# Patient Record
Sex: FEMALE | Race: BLACK OR AFRICAN AMERICAN | Employment: OTHER | URBAN - METROPOLITAN AREA
[De-identification: names, ages, dates, MRNs, and addresses within clinical notes are randomized per-mention and may not be internally consistent; named-entity substitution may affect disease eponyms.]

---

## 2022-04-02 ENCOUNTER — APPOINTMENT (OUTPATIENT)
Dept: GENERAL RADIOLOGY | Age: 77
DRG: 684 | End: 2022-04-02
Attending: HOSPITALIST
Payer: MEDICARE

## 2022-04-02 ENCOUNTER — APPOINTMENT (OUTPATIENT)
Dept: ULTRASOUND IMAGING | Age: 77
DRG: 684 | End: 2022-04-02
Attending: HOSPITALIST
Payer: MEDICARE

## 2022-04-02 ENCOUNTER — APPOINTMENT (OUTPATIENT)
Dept: CT IMAGING | Age: 77
DRG: 684 | End: 2022-04-02
Attending: EMERGENCY MEDICINE
Payer: MEDICARE

## 2022-04-02 ENCOUNTER — HOSPITAL ENCOUNTER (INPATIENT)
Age: 77
LOS: 3 days | Discharge: HOME HEALTH | DRG: 684 | End: 2022-04-05
Attending: EMERGENCY MEDICINE | Admitting: HOSPITALIST
Payer: MEDICARE

## 2022-04-02 DIAGNOSIS — N17.9 ACUTE RENAL FAILURE, UNSPECIFIED ACUTE RENAL FAILURE TYPE (HCC): Primary | ICD-10-CM

## 2022-04-02 PROBLEM — N19 RENAL FAILURE: Status: ACTIVE | Noted: 2022-04-02

## 2022-04-02 PROBLEM — E86.0 DEHYDRATION: Status: ACTIVE | Noted: 2022-04-02

## 2022-04-02 PROBLEM — R63.4 WEIGHT LOSS: Status: ACTIVE | Noted: 2022-04-02

## 2022-04-02 PROBLEM — Z72.0 TOBACCO USE: Status: ACTIVE | Noted: 2022-04-02

## 2022-04-02 PROBLEM — I10 HTN (HYPERTENSION): Status: ACTIVE | Noted: 2022-04-02

## 2022-04-02 PROBLEM — Z78.9 ALCOHOL USE: Status: ACTIVE | Noted: 2022-04-02

## 2022-04-02 LAB
ALBUMIN SERPL-MCNC: 3.2 G/DL (ref 3.4–5)
ALBUMIN/GLOB SERPL: 0.7 {RATIO} (ref 0.8–1.7)
ALP SERPL-CCNC: 39 U/L (ref 45–117)
ALT SERPL-CCNC: 29 U/L (ref 13–56)
ANION GAP SERPL CALC-SCNC: 10 MMOL/L (ref 3–18)
APPEARANCE UR: ABNORMAL
AST SERPL-CCNC: 58 U/L (ref 10–38)
BACTERIA URNS QL MICRO: ABNORMAL /HPF
BASOPHILS # BLD: 0 K/UL (ref 0–0.1)
BASOPHILS NFR BLD: 0 % (ref 0–2)
BILIRUB SERPL-MCNC: 1.1 MG/DL (ref 0.2–1)
BILIRUB UR QL: NEGATIVE
BUN SERPL-MCNC: 52 MG/DL (ref 7–18)
BUN/CREAT SERPL: 22 (ref 12–20)
CALCIUM SERPL-MCNC: 9.6 MG/DL (ref 8.5–10.1)
CHLORIDE SERPL-SCNC: 102 MMOL/L (ref 100–111)
CO2 SERPL-SCNC: 25 MMOL/L (ref 21–32)
COLOR UR: ABNORMAL
CREAT SERPL-MCNC: 2.4 MG/DL (ref 0.6–1.3)
DIFFERENTIAL METHOD BLD: ABNORMAL
EOSINOPHIL # BLD: 0 K/UL (ref 0–0.4)
EOSINOPHIL NFR BLD: 0 % (ref 0–5)
EPITH CASTS URNS QL MICRO: ABNORMAL /LPF (ref 0–5)
ERYTHROCYTE [DISTWIDTH] IN BLOOD BY AUTOMATED COUNT: 13.1 % (ref 11.6–14.5)
GLOBULIN SER CALC-MCNC: 4.3 G/DL (ref 2–4)
GLUCOSE SERPL-MCNC: 90 MG/DL (ref 74–99)
GLUCOSE UR STRIP.AUTO-MCNC: NEGATIVE MG/DL
HCT VFR BLD AUTO: 32.7 % (ref 35–45)
HGB BLD-MCNC: 10.9 G/DL (ref 12–16)
HGB UR QL STRIP: NEGATIVE
IMM GRANULOCYTES # BLD AUTO: 0.1 K/UL (ref 0–0.04)
IMM GRANULOCYTES NFR BLD AUTO: 1 % (ref 0–0.5)
KETONES UR QL STRIP.AUTO: ABNORMAL MG/DL
LEUKOCYTE ESTERASE UR QL STRIP.AUTO: ABNORMAL
LIPASE SERPL-CCNC: 356 U/L (ref 73–393)
LYMPHOCYTES # BLD: 1.4 K/UL (ref 0.9–3.6)
LYMPHOCYTES NFR BLD: 15 % (ref 21–52)
MCH RBC QN AUTO: 27.7 PG (ref 24–34)
MCHC RBC AUTO-ENTMCNC: 33.3 G/DL (ref 31–37)
MCV RBC AUTO: 83.2 FL (ref 78–100)
MONOCYTES # BLD: 1.1 K/UL (ref 0.05–1.2)
MONOCYTES NFR BLD: 12 % (ref 3–10)
NEUTS SEG # BLD: 6.6 K/UL (ref 1.8–8)
NEUTS SEG NFR BLD: 72 % (ref 40–73)
NITRITE UR QL STRIP.AUTO: NEGATIVE
NRBC # BLD: 0 K/UL (ref 0–0.01)
NRBC BLD-RTO: 0 PER 100 WBC
PH UR STRIP: 5 [PH] (ref 5–8)
PLATELET # BLD AUTO: 222 K/UL (ref 135–420)
PMV BLD AUTO: 11.9 FL (ref 9.2–11.8)
POTASSIUM SERPL-SCNC: 3.2 MMOL/L (ref 3.5–5.5)
PROT SERPL-MCNC: 7.5 G/DL (ref 6.4–8.2)
PROT UR STRIP-MCNC: NEGATIVE MG/DL
RBC # BLD AUTO: 3.93 M/UL (ref 4.2–5.3)
RBC #/AREA URNS HPF: ABNORMAL /HPF (ref 0–5)
SODIUM SERPL-SCNC: 137 MMOL/L (ref 136–145)
SP GR UR REFRACTOMETRY: 1.01 (ref 1–1.03)
UROBILINOGEN UR QL STRIP.AUTO: 1 EU/DL (ref 0.2–1)
WBC # BLD AUTO: 9.2 K/UL (ref 4.6–13.2)
WBC URNS QL MICRO: ABNORMAL /HPF (ref 0–5)

## 2022-04-02 PROCEDURE — 81001 URINALYSIS AUTO W/SCOPE: CPT

## 2022-04-02 PROCEDURE — 83690 ASSAY OF LIPASE: CPT

## 2022-04-02 PROCEDURE — 74011250636 HC RX REV CODE- 250/636: Performed by: EMERGENCY MEDICINE

## 2022-04-02 PROCEDURE — 80053 COMPREHEN METABOLIC PANEL: CPT

## 2022-04-02 PROCEDURE — 74011000250 HC RX REV CODE- 250: Performed by: HOSPITALIST

## 2022-04-02 PROCEDURE — 65270000029 HC RM PRIVATE

## 2022-04-02 PROCEDURE — 96374 THER/PROPH/DIAG INJ IV PUSH: CPT

## 2022-04-02 PROCEDURE — 74011250637 HC RX REV CODE- 250/637: Performed by: HOSPITALIST

## 2022-04-02 PROCEDURE — 74176 CT ABD & PELVIS W/O CONTRAST: CPT

## 2022-04-02 PROCEDURE — 96361 HYDRATE IV INFUSION ADD-ON: CPT

## 2022-04-02 PROCEDURE — 76856 US EXAM PELVIC COMPLETE: CPT

## 2022-04-02 PROCEDURE — 71046 X-RAY EXAM CHEST 2 VIEWS: CPT

## 2022-04-02 PROCEDURE — 74011250636 HC RX REV CODE- 250/636: Performed by: HOSPITALIST

## 2022-04-02 PROCEDURE — 85025 COMPLETE CBC W/AUTO DIFF WBC: CPT

## 2022-04-02 PROCEDURE — 99285 EMERGENCY DEPT VISIT HI MDM: CPT

## 2022-04-02 RX ORDER — MIRTAZAPINE 15 MG/1
15 TABLET, FILM COATED ORAL
Status: DISCONTINUED | OUTPATIENT
Start: 2022-04-02 | End: 2022-04-05 | Stop reason: HOSPADM

## 2022-04-02 RX ORDER — ONDANSETRON 2 MG/ML
4 INJECTION INTRAMUSCULAR; INTRAVENOUS
Status: COMPLETED | OUTPATIENT
Start: 2022-04-02 | End: 2022-04-02

## 2022-04-02 RX ORDER — SODIUM CHLORIDE 9 MG/ML
75 INJECTION, SOLUTION INTRAVENOUS CONTINUOUS
Status: DISCONTINUED | OUTPATIENT
Start: 2022-04-02 | End: 2022-04-05 | Stop reason: HOSPADM

## 2022-04-02 RX ORDER — HEPARIN SODIUM 5000 [USP'U]/ML
5000 INJECTION, SOLUTION INTRAVENOUS; SUBCUTANEOUS EVERY 8 HOURS
Status: DISCONTINUED | OUTPATIENT
Start: 2022-04-02 | End: 2022-04-05 | Stop reason: HOSPADM

## 2022-04-02 RX ORDER — FENOFIBRATE 50 MG/1
200 CAPSULE ORAL
COMMUNITY

## 2022-04-02 RX ORDER — ATENOLOL AND CHLORTHALIDONE TABLET 100; 25 MG/1; MG/1
1 TABLET ORAL DAILY
COMMUNITY
End: 2022-04-05

## 2022-04-02 RX ORDER — FENOFIBRATE 145 MG/1
145 TABLET, COATED ORAL DAILY
Status: DISCONTINUED | OUTPATIENT
Start: 2022-04-03 | End: 2022-04-05 | Stop reason: HOSPADM

## 2022-04-02 RX ORDER — POTASSIUM CHLORIDE 20 MEQ/1
40 TABLET, EXTENDED RELEASE ORAL
Status: COMPLETED | OUTPATIENT
Start: 2022-04-02 | End: 2022-04-02

## 2022-04-02 RX ORDER — AMLODIPINE AND BENAZEPRIL HYDROCHLORIDE 5; 20 MG/1; MG/1
2 CAPSULE ORAL DAILY
COMMUNITY
End: 2022-04-05

## 2022-04-02 RX ADMIN — MIRTAZAPINE 15 MG: 15 TABLET, FILM COATED ORAL at 17:06

## 2022-04-02 RX ADMIN — HEPARIN SODIUM 5000 UNITS: 5000 INJECTION INTRAVENOUS; SUBCUTANEOUS at 23:30

## 2022-04-02 RX ADMIN — ONDANSETRON 4 MG: 2 INJECTION INTRAMUSCULAR; INTRAVENOUS at 11:04

## 2022-04-02 RX ADMIN — THIAMINE HYDROCHLORIDE: 100 INJECTION, SOLUTION INTRAMUSCULAR; INTRAVENOUS at 17:00

## 2022-04-02 RX ADMIN — POTASSIUM CHLORIDE 40 MEQ: 20 TABLET, EXTENDED RELEASE ORAL at 17:06

## 2022-04-02 RX ADMIN — SODIUM CHLORIDE 100 ML/HR: 900 INJECTION, SOLUTION INTRAVENOUS at 17:07

## 2022-04-02 RX ADMIN — SODIUM CHLORIDE 1000 ML: 9 INJECTION, SOLUTION INTRAVENOUS at 11:07

## 2022-04-02 RX ADMIN — HEPARIN SODIUM 5000 UNITS: 5000 INJECTION INTRAVENOUS; SUBCUTANEOUS at 17:06

## 2022-04-02 NOTE — ED NOTES
pts daughter came out of room 7 up to the nurses station while workers had open patient charts, pt was given a call bell, but came to the station to flag a nurse down, When this nurse approached the pts daughter , I asked if there was an emergency as I was taking report on a new group of patients and giving report to the pts new nurse. Pt would not tell me if it was an emergency, but turned and walked away from me. She then went to my Charge nurse Dinorah Tomlinson to discuss her issue. I was not present during the conversation so I do not know what was going on with the pt. Kayla Rogers  Took report and went to check on pt.

## 2022-04-02 NOTE — PROGRESS NOTES
Unable to start IV fluids or Banana bag at this time, pt IV site is leaking. She is eating dinner at this time. Waiting for pt to finish dinner before attempting new site and starting fluids.

## 2022-04-02 NOTE — ED TRIAGE NOTES
I am not feeling well. I am having dry heaves I have not appetite.  The dr changed my medication and I have not been able to eat right since

## 2022-04-02 NOTE — PROGRESS NOTES
Pt admitted to floor from ED. She is pleasant and cooperative with care. Family is present at bedside, pleasant and cooperative with care as well. Pt is able to transfer to bed independently with ease. She reports no pain at this time and only minimal nausea. Her lungs are clear but diminished and bowels are active. Pt reports having a BM 2 days ago, but having recent issues with her bowels due to abx use prior to hospitalization which led to diarrhea and then imodium which led to constipation and she used miralax to help. Pt is encouraged to call for help when using the restroom for now until baseline functional status is determined. Pt reports using a cane at home where she lives with her mother independently. Skin is intact but dry and flaky. Trace edema noted to BLE but non-pitting and no tenderness reported. IV site is intact and flushed, capped. Call bell within reach and pt provided with water only.  Awaiting further orders from MD.

## 2022-04-02 NOTE — ED PROVIDER NOTES
52-year-old female with multiple medical issues presents to the emergency department with 5 months of not feeling well decreased p.o. intake and recent 50 pound weight loss. Patient has no history of this. She says she the ability to eat but does not want to eat. She also has been having dry heaves. Patient has no pain issues. Nothing makes it feel better or worse. Her daughter is at the bedside during interview daughter feels that patient is dehydrated. Patient is having bowel movements and having decreased urinary output. Patient has no other issues expressed. He has not seen her doctor since November of last year. Past Medical History:   Diagnosis Date    Hypertension        History reviewed. No pertinent surgical history. History reviewed. No pertinent family history. Social History     Socioeconomic History    Marital status: SINGLE     Spouse name: Not on file    Number of children: Not on file    Years of education: Not on file    Highest education level: Not on file   Occupational History    Not on file   Tobacco Use    Smoking status: Current Every Day Smoker    Smokeless tobacco: Not on file   Substance and Sexual Activity    Alcohol use: Yes     Comment: drinks about once or twice week    Drug use: Never    Sexual activity: Not on file   Other Topics Concern    Not on file   Social History Narrative    Not on file     Social Determinants of Health     Financial Resource Strain:     Difficulty of Paying Living Expenses: Not on file   Food Insecurity:     Worried About Running Out of Food in the Last Year: Not on file    Susanne of Food in the Last Year: Not on file   Transportation Needs:     Lack of Transportation (Medical): Not on file    Lack of Transportation (Non-Medical):  Not on file   Physical Activity:     Days of Exercise per Week: Not on file    Minutes of Exercise per Session: Not on file   Stress:     Feeling of Stress : Not on file   Social Connections:     Frequency of Communication with Friends and Family: Not on file    Frequency of Social Gatherings with Friends and Family: Not on file    Attends Jain Services: Not on file    Active Member of Clubs or Organizations: Not on file    Attends Club or Organization Meetings: Not on file    Marital Status: Not on file   Intimate Partner Violence:     Fear of Current or Ex-Partner: Not on file    Emotionally Abused: Not on file    Physically Abused: Not on file    Sexually Abused: Not on file   Housing Stability:     Unable to Pay for Housing in the Last Year: Not on file    Number of [place] in the Last Year: Not on file    Unstable Housing in the Last Year: Not on file         ALLERGIES: Patient has no known allergies. Review of Systems   Constitutional: Positive for appetite change. Negative for activity change, fatigue, fever and unexpected weight change. HENT: Negative. Respiratory: Negative. Cardiovascular: Negative. Gastrointestinal: Positive for nausea. Negative for abdominal distention, abdominal pain, anal bleeding, blood in stool, constipation and vomiting. Genitourinary: Negative. Musculoskeletal: Negative. Skin: Negative. Neurological: Negative. Hematological: Negative. All other systems reviewed and are negative. Vitals:    04/02/22 0949 04/02/22 1014 04/02/22 1029 04/02/22 1100   BP:  (!) 101/55 (!) 104/58 (!) 153/72   Pulse:    77   Resp:    16   Temp:       SpO2: 99% 100% 100% 100%   Weight:       Height:                Physical Exam  Vitals and nursing note reviewed. Constitutional:       General: She is not in acute distress. Appearance: Normal appearance. She is not ill-appearing, toxic-appearing or diaphoretic. HENT:      Head: Normocephalic. Nose: Nose normal.      Mouth/Throat:      Mouth: Mucous membranes are moist.   Eyes:      Extraocular Movements: Extraocular movements intact.    Neck:      Vascular: No carotid bruit. Cardiovascular:      Rate and Rhythm: Normal rate and regular rhythm. Pulses: Normal pulses. Heart sounds: Normal heart sounds. Pulmonary:      Effort: Pulmonary effort is normal.      Breath sounds: Normal breath sounds. Abdominal:      General: There is no distension. Palpations: Abdomen is soft. There is no mass. Tenderness: There is no abdominal tenderness. There is no right CVA tenderness, left CVA tenderness, guarding or rebound. Hernia: No hernia is present. Musculoskeletal:         General: Normal range of motion. Cervical back: Normal range of motion and neck supple. No rigidity or tenderness. Lymphadenopathy:      Cervical: No cervical adenopathy. Skin:     General: Skin is warm. Capillary Refill: Capillary refill takes less than 2 seconds. Neurological:      General: No focal deficit present. Mental Status: She is alert and oriented to person, place, and time. Psychiatric:         Mood and Affect: Mood normal.         Behavior: Behavior normal.          MDM  Number of Diagnoses or Management Options  Diagnosis management comments: I contacted the hospitalist  When I noticed that the patient had acute kidney injury. Last BMP in November was normal.  He wanted a CAT scan because he felt that the patient had a possible bowel obstruction. When the CAT scan result came back I contacted the hospitalist because my concern is she has kidney issue and is not tolerating p.o. He wanted patient to be hydrated and sent home. It was communicated to me \" there is nothing we can do for her in the hospital.\"  Patient has an admittable diagnosis and is not tolerating fluids due to anorexia. Do not want the patient to become more dehydrated and developed other issues. Family is requesting to speak to the hospitalist I spoke to the hospitalist waiting for him to come down to talk to family patient.        Amount and/or Complexity of Data Reviewed  Clinical lab tests: ordered and reviewed  Tests in the radiology section of CPT®: ordered and reviewed  Tests in the medicine section of CPT®: ordered and reviewed  Discussion of test results with the performing providers: yes  Decide to obtain previous medical records or to obtain history from someone other than the patient: yes  Obtain history from someone other than the patient: yes  Review and summarize past medical records: yes  Independent visualization of images, tracings, or specimens: yes    Risk of Complications, Morbidity, and/or Mortality  Presenting problems: moderate  Diagnostic procedures: moderate  Management options: moderate    Patient Progress  Patient progress: stable         Procedures

## 2022-04-02 NOTE — H&P
History & Physical    Patient: Ray Denny MRN: [id number]  CSN: [id number]    YOB: 1945  Age: 77 y.o. Sex: female      DOA: 4/2/2022  Primary Care Provider:  Maury Connor MD      Assessment/Plan     Patient Active Problem List   Diagnosis Code    Dehydration E86.0    Renal failure N19    HTN (hypertension) I10    Weight loss R63.4    Alcohol use Z72.89    Tobacco use Z72.0       Admit to medical floor    FARRAH -  Pre renal  No obstruction on CT abdomen  Started on IVF  Last renal function on record 10/2021 with normal BUN/Cr      HTN -  BP stable, will hold BP meds for now    Weight loss -  CT abdomen with questionable peripancreatic stranding suggestive of acute pancreatitis. Lipase in normal range. Also showed Indeterminate partially calcified mass with dystrophic calcifications of the posterior right adnexa, benign-appearing but incompletely assessed. Correlation  with prior imaging would be helpful. Consider follow-up nonemergent pelvic  Ultrasound. CXR given history of smoking. ETOH use -  Started on banana bag  Watch for withdrawal symptoms    PT/OT    DVT prophylaxis with heparin. Estimated length of stay : 1-2 days    CC: decreased PO intake. HPI:     Ray Denny is a 77 y.o. female with HTN, hyperlipidemia, is brought to ER by her daughter with concerns of decreased PO intake and weight loss. Patient reports that she had her HTN medication changed in [place] last year. Since then she has not been feeling well. Her symptoms which include weakness and decreased appetite got worse after she took booster dose of COVID 19 vaccine. Daughter reports that for the past one month she had progressive decline in her condition and appetite. She has not been eating or drinking. At times she has dry heaves. Daughter reports that patient lost about 50lbs in the past 5 months. Patient drinks beer daily. Is a smoker and has been smoking for years.    In ER work up showed elevated BUN/Cr at 52/2. 4. her CT abdomen/pelvis showed questionable pancreatitis, however lipase in normal range. Past Medical History:   Diagnosis Date    Hyperlipidemia     Hypertension        History reviewed. No pertinent surgical history. History reviewed. No pertinent family history. Social History     Socioeconomic History    Marital status: SINGLE   Tobacco Use    Smoking status: Current Every Day Smoker   Substance and Sexual Activity    Alcohol use: Yes     Comment: drinks about once or twice week    Drug use: Never       Prior to Admission medications    Medication Sig Start Date End Date Taking? Authorizing Provider   amLODIPine-benazepril (LOTREL) 5-20 mg per capsule Take 2 Capsules by mouth daily. Indications: high blood pressure   Yes Provider, Historical   atenoloL-chlorthalidone (TENORETIC) 100-25 mg per tablet Take 1 Tablet by mouth daily. Indications: high blood pressure   Yes Provider, Historical   Fenofibrate 50 mg cap Take 200 mg by mouth. Indications: excessive fat in the blood   Yes Provider, Historical       No Known Allergies    Review of Systems  Gen: No fever, chills, malaise, see above   Heent: No headache, rhinorrhea, epistaxis, ear pain, hearing loss, sinus pain, neck pain/stiffness, sore throat. Heart: No chest pain, palpitations, BEYER, pnd, or orthopnea. Resp: No cough, hemoptysis, wheezing and shortness of breath. GI: No nausea, vomiting, diarrhea, constipation, melena or hematochezia. : No urinary obstruction, dysuria or hematuria. Derm: No rash, new skin lesion or pruritis. Musc/skeletal: no bone or joint complains. Vasc: No edema, cyanosis or claudication. Endo: No heat/cold intolerance, no polyuria,polydipsia or polyphagia. Neuro: No unilateral weakness, numbness, tingling. No seizures. Heme: No easy bruising or bleeding.           Physical Exam:     Physical Exam:  Visit Vitals  /64   Pulse 69   Temp 97.4 °F (36.3 °C)   Resp 15 Ht 5' 5\" (1.651 m)   Wt 90.7 kg (200 lb)   SpO2 100%   BMI 33.28 kg/m²      O2 Device: None (Room air)    Temp (24hrs), Av.3 °F (36.3 °C), Min:97.1 °F (36.2 °C), Max:97.4 °F (36.3 °C)    701 -  1900  In: 1000 [I.V.:1000]  Out: -    No intake/output data recorded. General:  Awake, cooperative, no distress. Head:  Normocephalic, without obvious abnormality, atraumatic. Eyes:  Conjunctivae/corneas clear, sclera anicteric, PERRL, EOMs intact. Nose: Nares normal. No drainage or sinus tenderness. Throat: Lips, mucosa, and tongue normal.    Neck: Supple, symmetrical, trachea midline, no adenopathy. Lungs:   Clear to auscultation bilaterally. Heart:   S1, S2, no murmur, click, rub or gallop. Abdomen: Soft, non-tender. Bowel sounds normal. No masses,  No organomegaly. Extremities: Extremities normal, atraumatic, no cyanosis or edema. Capillary refill normal.   Pulses: 2+ and symmetric all extremities. Skin: Skin color pink, turgor normal. No rashes or lesions   Neurologic: CNII-XII intact. No focal motor or sensory deficit.        Labs Reviewed:    CMP:   Lab Results   Component Value Date/Time     2022 10:10 AM    K 3.2 (L) 2022 10:10 AM     2022 10:10 AM    CO2 25 2022 10:10 AM    AGAP 10 2022 10:10 AM    GLU 90 2022 10:10 AM    BUN 52 (H) 2022 10:10 AM    CREA 2.40 (H) 2022 10:10 AM    GFRAA 24 (L) 2022 10:10 AM    GFRNA 20 (L) 2022 10:10 AM    CA 9.6 2022 10:10 AM    ALB 3.2 (L) 2022 10:10 AM    TP 7.5 2022 10:10 AM    GLOB 4.3 (H) 2022 10:10 AM    AGRAT 0.7 (L) 2022 10:10 AM    ALT 29 2022 10:10 AM     CBC:   Lab Results   Component Value Date/Time    WBC 9.2 2022 10:10 AM    HGB 10.9 (L) 2022 10:10 AM    HCT 32.7 (L) 2022 10:10 AM     2022 10:10 AM     All Cardiac Markers in the last 24 hours: No results found for: CPK, CK, CKMMB, CKMB, RCK3, CKMBT, CKNDX, CKND1, KANA, TROPT, TROIQ, RAEGAN, TROPT, TNIPOC, BNP, BNPP      Procedures/imaging: see electronic medical records for all procedures/Xrays and details which were not copied into this note but were reviewed prior to creation of Plan    Please note that this dictation was completed with [employer], the computer voice recognition software. Quite often unanticipated grammatical, syntax, homophones, and other interpretive errors are inadvertently transcribed by the computer software. Please disregard these errors. Please excuse any errors that have escaped final proofreading.         CC: Lorenza Sandra MD

## 2022-04-02 NOTE — Clinical Note
Status[de-identified] INPATIENT [101]   Type of Bed: Medical [8]   Cardiac Monitoring Required?: No   Inpatient Hospitalization Certified Necessary for the Following Reasons: 3. Patient receiving treatment that can only be provided in an inpatient setting (further clarification in H&P documentation)   Admitting Diagnosis: Renal failure [247840]   Admitting Diagnosis: Dehydration [276.51. ICD-9-CM]   Admitting Physician: Jelena Carmichael [[id number]]   Attending Physician: Jelena Carmichael [[id number]]   Estimated Length of Stay: 2 Midnights   Discharge Plan[de-identified] Home with Office Follow-up

## 2022-04-02 NOTE — PROGRESS NOTES
Problem: General Medical Care Plan  Goal: *Vital signs within specified parameters  Outcome: Progressing Towards Goal  Goal: *Absence of infection signs and symptoms  Outcome: Progressing Towards Goal  Goal: *Optimal pain control at patient's stated goal  Outcome: Progressing Towards Goal  Goal: *Skin integrity maintained  Outcome: Progressing Towards Goal  Goal: *Fluid volume balance  Outcome: Progressing Towards Goal  Goal: *Anxiety reduced or absent  Outcome: Progressing Towards Goal  Goal: *Progressive mobility and function (eg: ADL's)  Outcome: Progressing Towards Goal

## 2022-04-03 LAB
ANION GAP SERPL CALC-SCNC: 6 MMOL/L (ref 3–18)
BUN SERPL-MCNC: 45 MG/DL (ref 7–18)
BUN/CREAT SERPL: 25 (ref 12–20)
CALCIUM SERPL-MCNC: 8.6 MG/DL (ref 8.5–10.1)
CHLORIDE SERPL-SCNC: 110 MMOL/L (ref 100–111)
CO2 SERPL-SCNC: 24 MMOL/L (ref 21–32)
CREAT SERPL-MCNC: 1.81 MG/DL (ref 0.6–1.3)
ERYTHROCYTE [DISTWIDTH] IN BLOOD BY AUTOMATED COUNT: 13.2 % (ref 11.6–14.5)
GLUCOSE SERPL-MCNC: 61 MG/DL (ref 74–99)
HCT VFR BLD AUTO: 26.7 % (ref 35–45)
HGB BLD-MCNC: 8.7 G/DL (ref 12–16)
MCH RBC QN AUTO: 27.9 PG (ref 24–34)
MCHC RBC AUTO-ENTMCNC: 32.6 G/DL (ref 31–37)
MCV RBC AUTO: 85.6 FL (ref 78–100)
NRBC # BLD: 0 K/UL (ref 0–0.01)
NRBC BLD-RTO: 0 PER 100 WBC
PLATELET # BLD AUTO: 165 K/UL (ref 135–420)
PMV BLD AUTO: 12.2 FL (ref 9.2–11.8)
POTASSIUM SERPL-SCNC: 4.5 MMOL/L (ref 3.5–5.5)
RBC # BLD AUTO: 3.12 M/UL (ref 4.2–5.3)
SODIUM SERPL-SCNC: 140 MMOL/L (ref 136–145)
TSH SERPL DL<=0.05 MIU/L-ACNC: 1.62 UIU/ML (ref 0.36–3.74)
WBC # BLD AUTO: 7.5 K/UL (ref 4.6–13.2)

## 2022-04-03 PROCEDURE — 74011000250 HC RX REV CODE- 250: Performed by: HOSPITALIST

## 2022-04-03 PROCEDURE — 97162 PT EVAL MOD COMPLEX 30 MIN: CPT

## 2022-04-03 PROCEDURE — 74011250636 HC RX REV CODE- 250/636: Performed by: HOSPITALIST

## 2022-04-03 PROCEDURE — 74011250637 HC RX REV CODE- 250/637: Performed by: HOSPITALIST

## 2022-04-03 PROCEDURE — 84425 ASSAY OF VITAMIN B-1: CPT

## 2022-04-03 PROCEDURE — 97116 GAIT TRAINING THERAPY: CPT

## 2022-04-03 PROCEDURE — 65270000029 HC RM PRIVATE

## 2022-04-03 PROCEDURE — 85027 COMPLETE CBC AUTOMATED: CPT

## 2022-04-03 PROCEDURE — 80048 BASIC METABOLIC PNL TOTAL CA: CPT

## 2022-04-03 PROCEDURE — 36415 COLL VENOUS BLD VENIPUNCTURE: CPT

## 2022-04-03 PROCEDURE — 84443 ASSAY THYROID STIM HORMONE: CPT

## 2022-04-03 RX ORDER — ASPIRIN 325 MG/1
100 TABLET, FILM COATED ORAL DAILY
Status: DISCONTINUED | OUTPATIENT
Start: 2022-04-03 | End: 2022-04-03

## 2022-04-03 RX ORDER — LOPERAMIDE HYDROCHLORIDE 2 MG/1
4 CAPSULE ORAL
Status: DISCONTINUED | OUTPATIENT
Start: 2022-04-03 | End: 2022-04-05 | Stop reason: HOSPADM

## 2022-04-03 RX ORDER — SAME BUTANEDISULFONATE/BETAINE 400-600 MG
250 POWDER IN PACKET (EA) ORAL 2 TIMES DAILY
Status: DISCONTINUED | OUTPATIENT
Start: 2022-04-03 | End: 2022-04-05 | Stop reason: HOSPADM

## 2022-04-03 RX ADMIN — HEPARIN SODIUM 5000 UNITS: 5000 INJECTION INTRAVENOUS; SUBCUTANEOUS at 08:42

## 2022-04-03 RX ADMIN — MIRTAZAPINE 15 MG: 15 TABLET, FILM COATED ORAL at 20:52

## 2022-04-03 RX ADMIN — Medication 250 MG: at 20:52

## 2022-04-03 RX ADMIN — FAMOTIDINE 20 MG: 10 INJECTION, SOLUTION INTRAVENOUS at 12:31

## 2022-04-03 RX ADMIN — THIAMINE HYDROCHLORIDE: 100 INJECTION, SOLUTION INTRAMUSCULAR; INTRAVENOUS at 09:23

## 2022-04-03 RX ADMIN — HEPARIN SODIUM 5000 UNITS: 5000 INJECTION INTRAVENOUS; SUBCUTANEOUS at 18:02

## 2022-04-03 RX ADMIN — FAMOTIDINE 20 MG: 10 INJECTION, SOLUTION INTRAVENOUS at 20:52

## 2022-04-03 RX ADMIN — SODIUM CHLORIDE 100 ML/HR: 900 INJECTION, SOLUTION INTRAVENOUS at 12:32

## 2022-04-03 RX ADMIN — THIAMINE HCL TAB 100 MG 100 MG: 100 TAB at 12:31

## 2022-04-03 RX ADMIN — SODIUM CHLORIDE 100 ML/HR: 900 INJECTION, SOLUTION INTRAVENOUS at 04:17

## 2022-04-03 RX ADMIN — FENOFIBRATE 145 MG: 145 TABLET ORAL at 08:42

## 2022-04-03 NOTE — PROGRESS NOTES
Hospitalist Progress Note    Patient: Gerard Aguilar MRN: [id number]  CSN: [id number]    YOB: 1945  Age: 77 y.o. Sex: female    DOA: 4/2/2022 LOS:  LOS: 1 day          Chief Complaint:    ARF      Assessment/Plan     FARRAH  etoh use  Dehydration  Tobacco use  unintentional weight loss  Diarrhea  anemia    This 76 yo female has progressively weakened , lost weight, and had diminished appetite X 3 months.   THis has been complicated by etoh use, many years of daily beer consumption, tobacco use (she has cut back lately), and poor dentition with infected gums and teeth requiring 2 weeks of abx outpatient about 2 weeks ago after which her daughter states her condition got even worse    CT unrevealing except for early pancreatic inflammation likely due to her etoh use and malnutrition  We have discussed importance of etoh cessation    She needs protein and calories and recommend supplements for her TID, and nutritional consult here    We will check her vitamin levels, continue IVF, recommend PT consult, check TSH, and repeat CBC/BMP in am    She should have colonoscopy as outpatient for surveillance    Her diarrhea is not odorous or suspicious for c diff, thus use imodium prn and start probiotics    She needs to stop smoking of course as well    We discussed all of this in room, her daughter present and interactive in discussion as well      Disposition :  Patient Active Problem List   Diagnosis Code    Dehydration E86.0    Renal failure N19    HTN (hypertension) I10    Weight loss R63.4    Alcohol use Z72.89    Tobacco use Z72.0       Subjective:    No new issues  Still with loose stool  No abd pain  Minimal appetite  No N/V    Upset with her daughter when discussing her etoh intake and her functional status at home    Review of systems:    Constitutional: denies fevers, chills  Respiratory: denies SOB, cough  Cardiovascular: denies chest pain  Gastrointestinal: denies nausea, vomiting      Vital signs/Intake and Output:  Visit Vitals  /60 (BP 1 Location: Left upper arm, BP Patient Position: Sitting)   Pulse 67   Temp 98.2 °F (36.8 °C)   Resp 16   Ht 5' 5\" (1.651 m)   Wt 90.7 kg (200 lb)   SpO2 99%   BMI 33.28 kg/m²     Current Shift:  04/03 0701 - 04/03 1900  In: 480 [P.O.:480]  Out: -   Last three shifts:  04/01 1901 - 04/03 0700  In: 2540 [P.O.:240; I.V.:2300]  Out: -     Exam:    General: elderly AAF, alert, NAD, OX3  Head/Neck: very poor dentition, multiple missing teeth, no abscess or tenderness felt  CVS:Regular rate and rhythm, no M/R/G, S1/S2 heard, no thrill  Lungs:Clear to auscultation bilaterally, no wheezes, rhonchi, or rales  Abdomen: Soft, Nontender, No distention, Normal Bowel sounds  Extremities: No C/C/E, pulses palpable 2+  Neuro:grossly normal , follows commands  Psych:appropriate                Labs: Results:       Chemistry Recent Labs     04/03/22  0425 04/02/22  1010   GLU 61* 90    137   K 4.5 3.2*    102   CO2 24 25   BUN 45* 52*   CREA 1.81* 2.40*   CA 8.6 9.6   AGAP 6 10   BUCR 25* 22*   AP  --  39*   TP  --  7.5   ALB  --  3.2*   GLOB  --  4.3*   AGRAT  --  0.7*      CBC w/Diff Recent Labs     04/03/22  0425 04/02/22  1010   WBC 7.5 9.2   RBC 3.12* 3.93*   HGB 8.7* 10.9*   HCT 26.7* 32.7*    222   GRANS  --  72   LYMPH  --  15*   EOS  --  0      Cardiac Enzymes No results for input(s): CPK, CKND1, KANA in the last 72 hours. No lab exists for component: CKRMB, TROIP   Coagulation No results for input(s): PTP, INR, APTT, INREXT in the last 72 hours. Lipid Panel No results found for: CHOL, CHOLPOCT, CHOLX, CHLST, CHOLV, 021296, HDL, HDLP, LDL, LDLC, DLDLP, 059623, VLDLC, VLDL, TGLX, TRIGL, TRIGP, TGLPOCT, CHHD, CHHDX   BNP No results for input(s): BNPP in the last 72 hours.    Liver Enzymes Recent Labs     04/02/22  1010   TP 7.5   ALB 3.2*   AP 39*      Thyroid Studies No results found for: T4, T3U, TSH, TSHEXT     Procedures/imaging: see electronic medical records for all procedures/Xrays and details which were not copied into this note but were reviewed prior to creation of Senthil Bray MD

## 2022-04-03 NOTE — PROGRESS NOTES
Care Management    Reason for Admission: Renal failure    Chart reviewed. Per H&P: \"    Prior to admission patient was Tobin Curtis is a 77 y.o. female with HTN, hyperlipidemia, is brought to ER by her daughter with concerns of decreased PO intake and weight loss. Patient reports that she had her HTN medication changed in [place] last year. Since then she has not been feeling well. Her symptoms which include weakness and decreased appetite got worse after she took booster dose of COVID 19 vaccine. Daughter reports that for the past one month she had progressive decline in her condition and appetite. She has not been eating or drinking. At times she has dry heaves. Daughter reports that patient lost about 50lbs in the past 5 months. Patient drinks beer daily. Is a smoker and has been smoking for years. In ER work up showed elevated BUN/Cr at 52/2. 4. her CT abdomen/pelvis showed questionable pancreatitis, however lipase in normal range. Prior to admission patient was using the following DME:  None, but did use her mom's cane this past week due to weakness from not eating                 RUR Score:13%                    Plan for utilizing home health:TBD          COVID Vaccine Status:     PCP: First and Last name: Reed Solis MD    Name of Practice:    Are you a current patient: Yes/No: yes   Approximate date of last visit: November   Can you participate in a virtual visit with your PCP:                     Current Advanced Directive/Advance Care Plan: Full Code    Healthcare Decision Maker: daughter Kojo Alvarez, [phone]  Click here to complete [address] including selection of the Healthcare Decision Maker Relationship (ie \"Primary\")                         Transition of Care Plan: In progress       CM met with patient at bedside, who stated that she lives with her mother as Peetrson Patricio are each other's caretakers. \" Patient states she does not use DME but in the past week she has needed to use her mother's cane due to weakness \"from not eating. \" The patient denies using home O2 and denies using a CPAP. The patient stated that she has an appointment with her PCP, Dr. Barbie Hernández, on April 14. CM notes patient has PT consult, CM to watch for results of PT consult. Care Management Interventions  PCP Verified by CM:  Yes  Last Visit to PCP: 11/01/21  Transition of Care Consult (CM Consult): Discharge Planning  Support Systems: Parent(s),Other Family Member(s)  Confirm Follow Up Transport: Family  Discharge Location  Patient [facility]s to be [facility]de[facility]  [facility]

## 2022-04-03 NOTE — PROGRESS NOTES
Problem: Falls - Risk of  Goal: *Absence of Falls  Description: Document Marifer Bryant Fall Risk and appropriate interventions in the flowsheet.   Outcome: Progressing Towards Goal  Note: Fall Risk Interventions:  Mobility Interventions: Communicate number of staff needed for ambulation/transfer,Patient to call before getting OOB,Utilize walker, cane, or other assistive device         Medication Interventions: Assess postural VS orthostatic hypotension,Patient to call before getting OOB,Teach patient to arise slowly    Elimination Interventions: Call light in reach,Patient to call for help with toileting needs

## 2022-04-03 NOTE — PROGRESS NOTES
Per night shift staff, patient noted with one loose BM and patient has had 2 loose BMs back to back on shift. BM is brown in color with no odor, watery. Pt stated she has had this issue in the past and required imodium. Paged MD for further orders, awaiting return call.

## 2022-04-03 NOTE — PROGRESS NOTES
20:15 Assessment completed. Lungs are clear bilat. Resting quietly in bed with visitor @ bedside. Offers 0 c/o pain or discomfort. 22:30 Shift assessment completed. See ns flow sheet for details. 02:50 Reassessed with 0 changes noted. Resting quietly in bed with eyes closed between cares. Daughter remains @ bedside in the recliner. 07:15 Bedside and Verbal shift change report given to [address] [place] [address],  [phone] (oncoming nurse) by David Patel RN (offgoing nurse). Report included the following information SBAR.

## 2022-04-03 NOTE — PROGRESS NOTES
Pt switched to clear liquid supplement with meals per her preference to not have a dairy based supplement. Pt is aware. MD ordered. No further concerns at this time. No PRN imodium provided at this time per pt request to see if she continues have diarrhea. NO further diarrhea noted on shift at this time. Will continue to monitor.

## 2022-04-03 NOTE — PROGRESS NOTES
Pt sitting in bed during shift assessment. Diarrhea has subsided at this time, seems to occur after patient eats meals. Hat in room for sample if needed. Patient fluids and banana bag infusing well. Pt tolerating fluids well and BP tends to be on the softer side. Taking BP manually at this time. She reports no pain and lungs are clear but diminished. No SOB noted at rest or on exertion. Pt tolerating about 50% of meal but requires encouragement to eat due to lack of appetite. No nausea or vomiting reported at this time. No further concerns noted. Will continue to monitor.

## 2022-04-03 NOTE — ACP (ADVANCE CARE PLANNING)
Advance Care Planning     General Advance Care Planning (ACP) Conversation      Date of Conversation: 4/2/2022  Conducted with: Patient with Decision Making Capacity    Healthcare Decision Maker:     Primary Decision Maker: shahab collier - Daughter - [phone]  Click here to complete [address] including selection of the Healthcare Decision Maker Relationship (ie \"Primary\")        Length of Voluntary ACP Conversation in minutes:  <16 minutes (Non-Billable)    Wei Cook RN

## 2022-04-03 NOTE — PROGRESS NOTES
Problem: Falls - Risk of  Goal: *Absence of Falls  Description: Document Yessy Francis Fall Risk and appropriate interventions in the flowsheet.   Outcome: Progressing Towards Goal  Note: Fall Risk Interventions:  Mobility Interventions: Utilize walker, cane, or other assistive device,Communicate number of staff needed for ambulation/transfer         Medication Interventions: Teach patient to arise slowly,Patient to call before getting OOB    Elimination Interventions: Bed/chair exit alarm,Patient to call for help with toileting needs,Call light in reach              Problem: General Medical Care Plan  Goal: *Vital signs within specified parameters  Outcome: Progressing Towards Goal  Goal: *Labs within defined limits  Outcome: Progressing Towards Goal  Goal: *Optimal pain control at patient's stated goal  Outcome: Progressing Towards Goal  Goal: *Skin integrity maintained  Outcome: Progressing Towards Goal  Goal: *Fluid volume balance  Outcome: Progressing Towards Goal  Goal: *Optimize nutritional status  Outcome: Progressing Towards Goal  Goal: *Anxiety reduced or absent  Outcome: Progressing Towards Goal  Goal: *Progressive mobility and function (eg: ADL's)  Outcome: Progressing Towards Goal

## 2022-04-03 NOTE — PROGRESS NOTES
Problem: Mobility Impaired (Adult and Pediatric)  Goal: *Acute Goals and Plan of Care (Insert Text)  Description: Physical Therapy Goals   Initiated 4/3/2022 and to be accomplished within 5-7 day(s)  1. Patient will move from supine <> sit with mod I in prep for out of bed activity and change of position. 2.  Patient will perform sit<> stand with mod I/LRAD in prep for transfers/ambulation. 3.  Patient will transfer from bed <> chair with mod I/LRAD for time up in chair for completion of ADL activity as appropriate. 4.  Patient will ambulate 200 feet with S/mod I/LRAD for improved functional mobility at discharge. 5.  Patient will ascend/descend flight of stairs with handrail(s) with S for home navigation as needed. Outcome: Progressing Towards Goal  PHYSICAL THERAPY EVALUATION    Patient: Ra Hazel (7 y.o. female)  Date: 4/3/2022  Primary Diagnosis: Renal failure [N19]  Dehydration [E86.0]  Precautions:   Fall  PLOF: amb without assist PTA; lives with mother and daughter in [address] [place] [address] with 0 WILFREDO and 12steps to upstairs bedroom with bilat HR's (though pt reports sleeping downstairs often). ASSESSMENT :  Based on the objective data described below, the patient is seen on medical unit following admission for dx as above. Pt presents with LE weakness, decr'd dynamic standing balance/gait disturbance and subsequent decr'd independence in functional mobility with regard to transfers, and gait. Pt found long sitting in bed and reports no pain. Demonstrates transition to sit EOB with supervision. Pt STS with CGA; participated with GT ~100ft in thorpe, CGA/min A, GB donned. Multiple of path deviations and pt reaching out for handrail/wall. Returned to room and trial use of FWW with CGA with improved balance noted. Verbal cues for use and safety.   Pt returned to bed with S/vc for independent placement of LE's onto bed w/o use of hands for assist.   Pt left in NAD with all needs in reach, grandson present and nurse [employer] notified of above. Reminded pt to call and wait for staff assist to get OOB. Pt expressed understanding. Answered pt questions regarding PT and mobility. Recommend HHPT and RW at this point for follow up physical therapy upon discharge to reach maximal level of independence/safety with functional mobility. Patient will benefit from skilled intervention to address the above impairments. Pt Education: Role of physical therapy in acute care setting, fall prevention and safety/technique during functional mobility tasks    Patient's rehabilitation potential is considered to be Good  Factors which may influence rehabilitation potential include:   []         None noted  []         Mental ability/status  [x]         Medical condition  []         Home/family situation and support systems  []         Safety awareness  []         Pain tolerance/management  []         Other:      PLAN :  Recommendations and Planned Interventions:   [x]           Bed Mobility Training             [x]    Neuromuscular Re-Education  [x]           Transfer Training                   []    Orthotic/Prosthetic Training  [x]           Gait Training                          []    Modalities  [x]           Therapeutic Exercises           []    Edema Management/Control  [x]           Therapeutic Activities            []    Family Training/Education  [x]           Patient Education  []           Other (comment):    Frequency/Duration: Patient will be followed by physical therapy 1-2 times per day/4-7 days per week to address goals. Discharge Recommendations: Home Physical Therapy  Further Equipment Recommendations for Discharge: rolling walker     SUBJECTIVE:   Patient stated I feel better.     OBJECTIVE DATA SUMMARY:     Past Medical History:   Diagnosis Date    Hyperlipidemia     Hypertension    History reviewed. No pertinent surgical history.   Barriers to Learning/Limitations: None  Compensate with: N/A  Home Situation:  Home Situation  Home Environment: Private residence  # Steps to Enter: 0  One/Two Story Residence: One story  # of Interior Steps: 12 (8+4 with landing between)  Living Alone: No  Support Systems: Parent(s),Child(na)  Patient Expects to be Discharged to[de-identified] Home  Current DME Used/Available at Home: [employer], straight,Crutches  Critical Behavior:  Neurologic State: Alert  Orientation Level: Oriented X4  Cognition: Follows commands  Safety/Judgement: Awareness of environment; Fall prevention  Psychosocial  Patient Behaviors: Calm; Cooperative  Family  Behaviors: Calm;Supportive (grandson)  Purposeful Interaction: Yes  Pt Identified Daily Priority: Clinical issues (comment) (ordered medications/fluids; diarhhea)  Caritas Process: Establish trust;Create healing environment; Attend basic human needs  Caring Interventions: Reassure  Reassure: Informing; Acceptance  Skin Condition/Temp: Warm;Dry;Flaky;Fragile  Family  Behaviors: Calm;Supportive (grandson)  Skin Integrity: Intact  Skin Integumentary  Skin Color: Appropriate for ethnicity  Skin Condition/Temp: Warm;Dry;Flaky;Fragile  Skin Integrity: Intact  Turgor: Epidermis thin w/ loss of subcut tissue  Hair Growth: Present  Varicosities: Absent  Nails: Exceptions to WDL  Exceptions to WDL: Thick  Strength:    Strength: Generally decreased, functional  Tone & Sensation:   Sensation: Intact  Range Of Motion:  AROM: Generally decreased, functional  Functional Mobility:  Bed Mobility:     Supine to Sit: Supervision  Sit to Supine: Supervision  Scooting: Modified independent  Transfers:  Sit to Stand: Supervision (vc)  Stand to Sit: Supervision (vc)  Balance:   Sitting: Intact  Standing: Impaired; With support  Standing - Static: Fair;Good  Standing - Dynamic : Fair  Ambulation/Gait Training:  Distance (ft): 130 Feet (ft) (100ft w/o AD; 30 with FWW)  Assistive Device: Gait belt;Walker, rolling  Ambulation - Level of Assistance: Contact guard assistance  Gait Description (WDL): Exceptions to WDL  Gait Abnormalities: Decreased step clearance; Path deviations  Speed/Ashely: Pace decreased (<100 feet/min)  Interventions: Safety awareness training;Verbal cues; Visual/Demos  Pain:  Pain level pre-treatment: 0/10   Pain level post-treatment: 0/10   Pain Intervention(s) : Medication (see MAR); Rest, Ice, Repositioning  Response to intervention: Nurse notified, See doc flow  Activity Tolerance:   Fair   Please refer to the flowsheet for vital signs taken during this treatment. After treatment:   []         Patient left in no apparent distress sitting up in chair  [x]         Patient left in no apparent distress in bed  [x]         Call bell left within reach  [x]         Nursing notified  [x]         Grandson present  []         Bed alarm activated  []         SCDs applied    COMMUNICATION/EDUCATION:   [x]         Role of Physical Therapy in the acute care setting. [x]         Fall prevention education was provided and the patient/caregiver indicated understanding. [x]         Patient/family have participated as able in goal setting and plan of care. [x]         Patient/family agree to work toward stated goals and plan of care. []         Patient understands intent and goals of therapy, but is neutral about his/her participation. []         Patient is unable to participate in goal setting/plan of care: ongoing with therapy staff.  []         Other:     Thank you for this referral.  Nakul Harding, PT   Time Calculation: 27 mins      Eval Complexity: History: HIGH Complexity :3+ comorbidities / personal factors will impact the outcome/ POC Exam:MEDIUM Complexity : 3 Standardized tests and measures addressing body structure, function, activity limitation and / or participation in recreation  Presentation: MEDIUM Complexity : Evolving with changing characteristics  Clinical Decision Making:Medium Complexity    Overall Complexity:MEDIUM

## 2022-04-04 LAB
ANION GAP SERPL CALC-SCNC: 8 MMOL/L (ref 3–18)
BUN SERPL-MCNC: 33 MG/DL (ref 7–18)
BUN/CREAT SERPL: 19 (ref 12–20)
CALCIUM SERPL-MCNC: 8 MG/DL (ref 8.5–10.1)
CHLORIDE SERPL-SCNC: 111 MMOL/L (ref 100–111)
CO2 SERPL-SCNC: 20 MMOL/L (ref 21–32)
CREAT SERPL-MCNC: 1.72 MG/DL (ref 0.6–1.3)
ERYTHROCYTE [DISTWIDTH] IN BLOOD BY AUTOMATED COUNT: 13.2 % (ref 11.6–14.5)
FOLATE SERPL-MCNC: >20 NG/ML (ref 3.1–17.5)
GLUCOSE BLD STRIP.AUTO-MCNC: 74 MG/DL (ref 70–110)
GLUCOSE SERPL-MCNC: 101 MG/DL (ref 74–99)
HCT VFR BLD AUTO: 25.1 % (ref 35–45)
HGB BLD-MCNC: 8.1 G/DL (ref 12–16)
IRON SATN MFR SERPL: 62 % (ref 20–50)
IRON SERPL-MCNC: 87 UG/DL (ref 50–175)
MCH RBC QN AUTO: 27.7 PG (ref 24–34)
MCHC RBC AUTO-ENTMCNC: 32.3 G/DL (ref 31–37)
MCV RBC AUTO: 86 FL (ref 78–100)
NRBC # BLD: 0 K/UL (ref 0–0.01)
NRBC BLD-RTO: 0 PER 100 WBC
PLATELET # BLD AUTO: 161 K/UL (ref 135–420)
PMV BLD AUTO: 12.1 FL (ref 9.2–11.8)
POTASSIUM SERPL-SCNC: 3.7 MMOL/L (ref 3.5–5.5)
RBC # BLD AUTO: 2.92 M/UL (ref 4.2–5.3)
SODIUM SERPL-SCNC: 139 MMOL/L (ref 136–145)
TIBC SERPL-MCNC: 140 UG/DL (ref 250–450)
VIT B12 SERPL-MCNC: 723 PG/ML (ref 211–911)
WBC # BLD AUTO: 7 K/UL (ref 4.6–13.2)

## 2022-04-04 PROCEDURE — 83540 ASSAY OF IRON: CPT

## 2022-04-04 PROCEDURE — 80048 BASIC METABOLIC PNL TOTAL CA: CPT

## 2022-04-04 PROCEDURE — 74011250636 HC RX REV CODE- 250/636: Performed by: HOSPITALIST

## 2022-04-04 PROCEDURE — 74011250637 HC RX REV CODE- 250/637: Performed by: HOSPITALIST

## 2022-04-04 PROCEDURE — 74011000250 HC RX REV CODE- 250: Performed by: HOSPITALIST

## 2022-04-04 PROCEDURE — 99221 1ST HOSP IP/OBS SF/LOW 40: CPT | Performed by: NURSE PRACTITIONER

## 2022-04-04 PROCEDURE — 82607 VITAMIN B-12: CPT

## 2022-04-04 PROCEDURE — 97166 OT EVAL MOD COMPLEX 45 MIN: CPT

## 2022-04-04 PROCEDURE — 65270000029 HC RM PRIVATE

## 2022-04-04 PROCEDURE — 85027 COMPLETE CBC AUTOMATED: CPT

## 2022-04-04 PROCEDURE — 82962 GLUCOSE BLOOD TEST: CPT

## 2022-04-04 RX ORDER — ASPIRIN 325 MG/1
100 TABLET, FILM COATED ORAL DAILY
Status: DISCONTINUED | OUTPATIENT
Start: 2022-04-04 | End: 2022-04-05 | Stop reason: HOSPADM

## 2022-04-04 RX ORDER — ACETAMINOPHEN 325 MG/1
650 TABLET ORAL
Status: DISCONTINUED | OUTPATIENT
Start: 2022-04-04 | End: 2022-04-05 | Stop reason: HOSPADM

## 2022-04-04 RX ADMIN — Medication 250 MG: at 20:06

## 2022-04-04 RX ADMIN — FAMOTIDINE 20 MG: 10 INJECTION, SOLUTION INTRAVENOUS at 20:06

## 2022-04-04 RX ADMIN — HEPARIN SODIUM 5000 UNITS: 5000 INJECTION INTRAVENOUS; SUBCUTANEOUS at 17:31

## 2022-04-04 RX ADMIN — Medication 250 MG: at 09:34

## 2022-04-04 RX ADMIN — Medication 100 MG: at 09:34

## 2022-04-04 RX ADMIN — MIRTAZAPINE 15 MG: 15 TABLET, FILM COATED ORAL at 20:06

## 2022-04-04 RX ADMIN — HEPARIN SODIUM 5000 UNITS: 5000 INJECTION INTRAVENOUS; SUBCUTANEOUS at 09:34

## 2022-04-04 RX ADMIN — FENOFIBRATE 145 MG: 145 TABLET ORAL at 09:34

## 2022-04-04 RX ADMIN — MULTIPLE VITAMINS W/ MINERALS TAB 1 TABLET: TAB at 09:34

## 2022-04-04 RX ADMIN — HEPARIN SODIUM 5000 UNITS: 5000 INJECTION INTRAVENOUS; SUBCUTANEOUS at 00:06

## 2022-04-04 RX ADMIN — FAMOTIDINE 20 MG: 10 INJECTION, SOLUTION INTRAVENOUS at 09:34

## 2022-04-04 NOTE — PROGRESS NOTES
CM met with pt at bedside to discuss transition of care. Pt is independent. Anticipate pt will transition home with in the next 24-48 hours with physician follow up. No transition of care needs have been identified at this time. CM to continue to follow and assist as needed. Care Management Interventions  PCP Verified by CM:  Yes  Last Visit to PCP: 11/01/21  Transition of Care Consult (CM Consult): Discharge Planning  Support Systems: Child(na),Parent(s)  Confirm Follow Up Transport: Family  Discharge Location  Patient Expects to be Discharged to[de-identified] Home

## 2022-04-04 NOTE — PROGRESS NOTES
Comprehensive Nutrition Assessment    Type and Reason for Visit: Initial,Consult    Nutrition Recommendations/Plan: Continue with oral diet and nutritional supplements (ensure clear) to meet needs. Nutrition Assessment:  Patient admitted for FARRAH- improving, ETOH use no withdrawals, Dehydration- improved, tobacco use, unintentional weight lost, tobacco use. Malnutrition Assessment:  Malnutrition Status: At risk for malnutrition (specify)    Context:  Chronic illness     Findings of the 6 clinical characteristics of malnutrition:   Energy Intake:  7 - 75% or less est energy requirements for 1 month or longer  Weight Loss:  Mild weight loss      Estimated Daily Nutrient Needs:  Energy (kcal): 9828-3726; Weight Used for Energy Requirements: Current  Protein (g): 90; Weight Used for Protein Requirements: Current (1g)  Fluid (ml/day): 7215-3993; Method Used for Fluid Requirements: 1 ml/kcal    Nutrition Related Findings:  Lab and meds reviewed- thiamine, florastor, mvi. BM 4/3. Spoke with pt and daughter was at bedside. Pt reports appetite has been on and off since January due to pt teeth/gums. However, pt stated appetite has been better now. Noted meal tray at bedside, stated did not eat lunch pt was still full from breakfast. Pt stated normally only eats lunch or brunch and dinner (2 meals/day). When asked about weight lost- pt stated was about 207lb. Was unable to obtain how long ago. Weight hx was reviewed per chart review: 215lb (9/2020), 206lb (10/2021), 204lb (11/2021). When asked about ensure clear- pt has been drinking ensure clear and enjoys them. Reported tried ensure prior but stomach does not do well with them. Pt stated normally does not drink regular milk either- drinks lactaid. Pt and daughter asked if ensure clear can be purchased- made aware available at stores such as [employer].  Also suggested trying Boost breeze which is a clear liquid option and ensure plant based protein shake or Gavino Chatterjee Vyteris (plant based options). Also provided handout for \"Eating, Diet, & Nutrition for Lactose Intolerance\". Pt had no other questions or concerns at this time. Wounds:    None       Current Nutrition Therapies:  ADULT ORAL NUTRITION SUPPLEMENT Breakfast, Lunch, Dinner; Clear Liquid  ADULT DIET Regular    Anthropometric Measures:  · Height:  5' 5\" (165.1 cm)  · Current Body Wt:  90.7 kg (200 lb)   · Usual Body Wt:  93.4 kg (206 lb) (10/2021)     · Ideal Body Wt:  125 lbs:  160 %   · BMI Category:  Obese class 1 (BMI 30.0-34. 9)       Nutrition Diagnosis:   · Inadequate energy intake related to acute injury/trauma as evidenced by poor intake prior to admission,weight loss    Nutrition Interventions:   Food and/or Nutrient Delivery: Continue current diet,Continue oral nutrition supplement  Nutrition Education and Counseling: No recommendations at this time  Coordination of Nutrition Care: Continue to monitor while inpatient    Goals:  PO intake >75% of needs throughout the next 2-4 days       Nutrition Monitoring and Evaluation:   Behavioral-Environmental Outcomes: None identified  Food/Nutrient Intake Outcomes: Food and nutrient intake,Supplement intake  Physical Signs/Symptoms Outcomes: Biochemical data,Meal time behavior,Skin,Weight,GI status    Discharge Planning:    Continue current diet,Continue oral nutrition supplement     Electronically signed by Morgan Rodriguez RD on 4/4/2022 at 4:19 PM

## 2022-04-04 NOTE — ACP (ADVANCE CARE PLANNING)
Advance Care Planning   Advance Care Planning Inpatient Note  [facility]    Today's Date: 4/4/2022  Unit: THE [address] SURGICAL/ONCOLOGY    Received request from rounding. Upon review of chart and communication with care team, patient's decision making abilities are not in question. Patient and Child/children were present in the room during visit. Goals of ACP Conversation:  Discuss Advance Care planning documents    Health Care Decision Makers:      Primary Decision Maker: Thanh Jonas - [facility] - [phone]      Summary:  No Decision Maker named by patient at this time    Advance Care Planning Documents (Patient Wishes) on file:  None     Assessment:    Patient not willing to name anyone at this time.       Interventions:  Deferred conversation as patient not interested in completing an advance directive at this time    Care Preferences Communicated:  No    Outcomes/Plan:  ACP Discussion Postponed    Chaplain Manuel on 4/4/2022 at 3:02 PM

## 2022-04-04 NOTE — CONSULTS
Palliative Medicine Consult    Patient Name: Alen Torres  YOB: 1945    Date of Initial Consult: 4/4/2022  Reason for Consult: Goals of care discussions  Requesting Provider: Dr. Racheal Hernandez  Primary Care Physician: Kvng Gomes MD      SUMMARY:   Alen Torres is a 77 y.o. with a past history of breast cancer status post mastectomy, hypertension and hyperlipidemia, EtOH use, and tobacco use, who was admitted on 4/2/2022 from home with a diagnosis of acute kidney injury, dehydration, EtOH use, tobacco use, unintentional weight loss, diarrhea, and anemia. Current medical issues leading to Palliative Medicine involvement include: goals of care discussions. PALLIATIVE DIAGNOSES:   1. Goals of care discussions  2. Dehydration/FARRAH  3. EtOH use/tobacco use  4. Unintentional weight loss  5. Advanced age/debility       PLAN:   1. Goals of care discussions: Palliative medicine team including Katlyn Pepper RN and I met with patient and patient's daughter Glennette Severs at patient's bedside. Patient sitting up on the edge of the bed eating food, awake, alert, and oriented x4. Patient lives with her mother Sharda Mario who is 80, and daughter Valorie Chapman is living in the house also to help out. Patient's legal next of kin is her only child/daughter Glennette Severs. Patient does not have an AMD on file, she was not interested in AMD completion today, and acknowledges that she trusts her daughter Valorie Chapman to make medical decisions in the event she loses capacity. Brief discussion regarding CODE STATUS. Patient and daughter verbalized understanding their options but were not interested in conversations about the benefits and burdens of resuscitative efforts. Information of artificial ventilation, artificial nutrition, and CPR was left for them to review. At this time patient requests to remain a full code with full interventions.   Highly encouraged patient and daughter to continue these discussions, and include PCP in care discussions. Will sign off as goals of care have been established. Please re-consult should it be warranted. Thank you for the opportunity to provide care to this patient. 2. Dehydration/FARRAH: Improving since admission, counseled on cessation for EtOH use. 3. EtOH use/tobacco use: Cessation counseling provided. 4. Unintentional weight loss: Patient's daughter reported 50 pounds in the last 5 months. Patient has difficulty tolerating p.o. intake, often times dry heaves. Recommended smaller, more frequent meals throughout the day along with EtOH cessation. 5. Advanced age/debility: 78-year-old female who lives with her mom of 80 years, and her daughter. Both patient and daughter help care for patient's elderly mother. Prior to admission patient was independent with activities of daily living, however she does have generalized weakness at this time. 6. Initial consult note routed to primary continuity provider  7.  Communicated plan of care with: Palliative IDT       GOALS OF CARE / TREATMENT PREFERENCES:   [====Goals of Care====]  GOALS OF CARE: Full code with full interventions  Patient/Health Care Proxy Stated Goals: Prolong life      TREATMENT PREFERENCES:   Code Status: Full Code    Advance Care Planning:  Advance Care Planning 4/4/2022   Patient's Healthcare Decision Maker is: Legal Next of Kin   Confirm Advance Directive None   Patient Would Like to Complete Advance Directive No       Medical Interventions: Full interventions           The palliative care team has discussed with patient / health care proxy about goals of care / treatment preferences for patient.  [====Goals of Care====]         HISTORY:     History obtained from: Patient, family, chart    CHIEF COMPLAINT: Dehydration, acute kidney injury, EtOH use, unintentional weight loss, diarrhea, anemia    HPI/SUBJECTIVE:    The patient is:   [x] Verbal and participatory  [] Non-participatory due to:   Oriented x4    Clinical Pain Assessment (nonverbal scale for severity on nonverbal patients):   Clinical Pain Assessment  Severity: 0            FUNCTIONAL ASSESSMENT:     Palliative Performance Scale (PPS):  PPS: 60       PSYCHOSOCIAL/SPIRITUAL SCREENING:     Advance Care Planning:  Advance Care Planning 4/4/2022   Patient's Healthcare Decision Maker is: Legal Next of Kin   Confirm Advance Directive None   Patient Would Like to Complete Advance Directive No        Any spiritual / Christian concerns:  [] Yes /  [x] No    Caregiver Burnout:  [] Yes /  [x] No /  [] No Caregiver Present      Anticipatory grief assessment:   [x] Normal  / [] Maladaptive             REVIEW OF SYSTEMS:     Positive and pertinent negative findings in ROS are noted above in HPI. The following systems were [x] reviewed / [] unable to be reviewed as noted in HPI  Other findings are noted below. Systems: constitutional, ears/nose/mouth/throat, respiratory, gastrointestinal, genitourinary, musculoskeletal, integumentary, neurologic, psychiatric, endocrine. Positive findings noted below. Modified ESAS Completed by: provider   Fatigue: 3       Pain: 0   Anxiety: 0 Nausea: 0     Dyspnea: 0     Constipation: No     Stool Occurrence(s): 2        PHYSICAL EXAM:     From RN flowsheet:  Wt Readings from Last 3 Encounters:   04/02/22 90.7 kg (200 lb)     Blood pressure (!) 99/58, pulse 76, temperature 98.3 °F (36.8 °C), resp. rate 16, height 5' 5\" (1.651 m), weight 90.7 kg (200 lb), SpO2 99 %.     Pain Scale 1: Numeric (0 - 10)  Pain Intensity 1: 0                      Constitutional: Awake, alert, sitting on edge of bed, in no acute distress  Eyes: pupils equal, anicteric  ENMT: no nasal discharge, moist mucous membranes  Cardiovascular: regular rhythm, distal pulses intact  Respiratory: breathing not labored, symmetric  Gastrointestinal: soft non-tender   Musculoskeletal: no deformity, no tenderness to palpation  Skin: warm, dry  Neurologic: following commands, moving all extremities, oriented x4  Psychiatric: full affect, no hallucinations          HISTORY:     Principal Problem:    Renal failure (4/2/2022)    Active Problems:    Dehydration (4/2/2022)      HTN (hypertension) (4/2/2022)      Weight loss (4/2/2022)      Alcohol use (4/2/2022)      Tobacco use (4/2/2022)      Past Medical History:   Diagnosis Date    Hyperlipidemia     Hypertension       History reviewed. No pertinent surgical history. History reviewed. No pertinent family history. History reviewed, no pertinent family history.   Social History     Tobacco Use    Smoking status: Current Every Day Smoker    Smokeless tobacco: Not on file   Substance Use Topics    Alcohol use: Yes     Comment: drinks about once or twice week     No Known Allergies   Current Facility-Administered Medications   Medication Dose Route Frequency    thiamine mononitrate (B-1) tablet 100 mg  100 mg Oral DAILY    multivitamin, tx-iron-ca-min (THERA-M w/ IRON) tablet 1 Tablet  1 Tablet Oral DAILY    acetaminophen (TYLENOL) tablet 650 mg  650 mg Oral Q6H PRN    famotidine (PF) (PEPCID) 20 mg in 0.9% sodium chloride 10 mL injection  20 mg IntraVENous Q12H    loperamide (IMODIUM) capsule 4 mg  4 mg Oral Q4H PRN    Saccharomyces boulardii (FLORASTOR) capsule 250 mg  250 mg Oral BID    0.9% sodium chloride infusion  75 mL/hr IntraVENous CONTINUOUS    mirtazapine (REMERON) tablet 15 mg  15 mg Oral QHS    fenofibrate nanocrystallized (TRICOR) tablet 145 mg  145 mg Oral DAILY    heparin (porcine) injection 5,000 Units  5,000 Units SubCUTAneous Q8H          LAB AND IMAGING FINDINGS:     Lab Results   Component Value Date/Time    WBC 7.0 04/04/2022 01:11 AM    HGB 8.1 (L) 04/04/2022 01:11 AM    PLATELET 783 47/41/0732 01:11 AM     Lab Results   Component Value Date/Time    Sodium 139 04/04/2022 01:11 AM    Potassium 3.7 04/04/2022 01:11 AM    Chloride 111 04/04/2022 01:11 AM    CO2 20 (L) 04/04/2022 01:11 AM    BUN 33 (H) 04/04/2022 01:11 AM Creatinine 1.72 (H) 04/04/2022 01:11 AM    Calcium 8.0 (L) 04/04/2022 01:11 AM      Lab Results   Component Value Date/Time    Alk. phosphatase 39 (L) 04/02/2022 10:10 AM    Protein, total 7.5 04/02/2022 10:10 AM    Albumin 3.2 (L) 04/02/2022 10:10 AM    Globulin 4.3 (H) 04/02/2022 10:10 AM     No results found for: INR, PTMR, PTP, PT1, PT2, APTT, INREXT, INREXT   Lab Results   Component Value Date/Time    Iron 87 04/04/2022 01:11 AM    TIBC 140 (L) 04/04/2022 01:11 AM    Iron % saturation 62 (H) 04/04/2022 01:11 AM      No results found for: PH, PCO2, PO2  No components found for: GLPOC   No results found for: CPK, CKMB             Total time: 30 minutes  Counseling / coordination time, spent as noted above:   > 50% counseling / coordination?:  Yes, patient, family, medical team    Prolonged service was provided for  []30 min   []75 min in face to face time in the presence of the patient, spent as noted above.   Time Start:   Time End:

## 2022-04-04 NOTE — ACP (ADVANCE CARE PLANNING)
Advance Care Planning     General Advance Care Planning (ACP) Conversation    Date of Conversation: 4/4/2022  Conducted with: Patient with Decision Making Capacity and Healthcare Decision Maker: Next of Kin by law (only applies in absence of a Healthcare Power of  or Legal Guardian)    Healthcare Decision Maker:     Primary Decision Maker: Antonio Eaton - Daughter - [phone]  Ms Betty Easley is her only child    Today we documented Decision Maker(s) consistent with Legal Next of Kin hierarchy. Content/Action Overview:   Has NO ACP documents/care preferences - information provided, considering goals and options  Reviewed DNR/DNI and patient elects Full Code (Attempt Resuscitation)    Length of Voluntary ACP Conversation in minutes:  <16 minutes (Non-Billable)     4/4/2022 1210 Seen today in room 312 along with Sierra Cash NP. Lying in bed with head of bed at 90 degrees eating lunch. Awake, alert. Oriented x 4. No t very engaged in conversation. Her daughter, Kassie Dakin, was in the room visiting. Respirations unlabored on room air. Came to the ED on 4/2/2022 from home per POV for dry heaves with decreased appetite    Admitted for acute kidney injury--prerenal (IVF, monitor labs), weight loss evaluation, follow for ETOH withdrawal (banana bag), PT/OT    PMH (from record review) significant for breast cancer s/p mastectomy, ETOH use    The palliative care team has been consulted for goals of care discussion    Introduced the role of palliative medicine for the hospitalized patient. Lives in a single family home with her mother, Jaspreet Vega, who is 80years old. Kassie Dakin is currently living with them for a short time. Prior to admission,she was independent in ADLs. Discussed that per [facility] is the legal next of kin and would be surrogate medical decision maker. They both say that is acceptable. Brief discussion re: code status.  They verbalized understanding of their options but were not interested in conversations about the benefits and burdens of resuscitative options. Information about artificial ventilation, artificial nutrition, and CPR was left for them to review. Highly encouraged to continue discussions amongst themselves and primary care providers. Disposition plan: anticipate home with family support    After meeting with patient and her daughter, Romaine Benitez, the goals of care have been defined. Code status remains: FULL CODE  AMD status: none on file. Mechelle, daughter, is legal next of kin. Will sign off but remain available for reconsult as needed/if appropriate.      Thank you for the Palliative Medicine consult and allowing us to participate in the care of [address] RN, MSN  Palliative Medicine   [phone]

## 2022-04-04 NOTE — PROGRESS NOTES
AMD - Not Interested   addressed Advance Medical Directives with the patient. Patient is not interested at this time.  completed visit with patient and offered Pastoral care. Chaplains will continue to follow and will provide pastoral care as needed or requested.     Sister Karen Mckee, [place], [address]  [phone]

## 2022-04-04 NOTE — PROGRESS NOTES
Patient is resting comofortably in bed with daughter and another visitor at bedside. Patient denied any pain or discomfort. Patient is a+ox4 and very pleasant. Patient is continent of bowel and bladder. Patient tolerated all medications well. Call bell and personal items within reach.

## 2022-04-04 NOTE — PROGRESS NOTES
Hospitalist Progress Note    Patient: Victorino Jaeger MRN: [id number]  CSN: [id number]    YOB: 1945  Age: 77 y.o. Sex: female    DOA: 4/2/2022 LOS:  LOS: 2 days          Chief Complaint:    ARF      Assessment/Plan        FARRAH-improved with GFR 24% on admission, now up 11 points, baseline >60 in October 2021  etoh use, no withdrawal, we have counselled cessation and she understands  Dehydration-much improved  Tobacco use  unintentional weight loss  Diarrhea, noninfectious  Anemia, normal iron     This 76 yo female has progressively weakened , lost weight, and had diminished appetite X 3 months.   THis has been complicated by etoh use, many years of daily beer consumption, tobacco use (she has cut back lately), and poor dentition with infected gums and teeth requiring 2 weeks of abx outpatient about 2 weeks ago after which her daughter states her condition got even worse     CT unrevealing except for early pancreatic inflammation likely due to her etoh use and malnutrition  We have discussed importance of etoh cessation     She needs protein and calories and recommend supplements for her TID, and nutritional consult here     Vitamin levels unrevealing     She should have colonoscopy as outpatient for surveillance     Her diarrhea is not odorous or suspicious for c diff, thus use imodium prn and continue probiotics     She needs to stop smoking of course as well     We discussed all of this in room, again today, her daughter present and interactive in discussion as well    PT consulted    Plan d/c home tomorrow  Will have another BMP in am to ensure Cr continues to improve  Disposition :  Patient Active Problem List   Diagnosis Code    Dehydration E86.0    Renal failure N19    HTN (hypertension) I10    Weight loss R63.4    Alcohol use Z72.89    Tobacco use Z72.0       Subjective:  Feeling better  Eating eggs this am  Not happy with diet options sent  Lactose intol  Daughter feels she looks much better today  No further diarrhea      Review of systems:    Constitutional: denies fevers, chills  Respiratory: denies SOB  Gastrointestinal: denies nausea, vomiting, diarrhea      Vital signs/Intake and Output:  Visit Vitals  BP (!) 92/49 (BP 1 Location: Left lower arm, BP Patient Position: At rest)   Pulse 67   Temp 98.6 °F (37 °C)   Resp 16   Ht 5' 5\" (1.651 m)   Wt 90.7 kg (200 lb)   SpO2 100%   BMI 33.28 kg/m²     Current Shift:  No intake/output data recorded. Last three shifts:  04/02 1901 - 04/04 0700  In: 4360 [P.O.:960; I.V.:3400]  Out: -     Exam:    General: Well developed, alert, NAD, OX3  CVS:Regular rate and rhythm, no M/R/G, S1/S2 heard, no thrill  Lungs:Clear to auscultation bilaterally, no wheezes, rhonchi, or rales  Abdomen: Soft, Nontender, No distention, Normal Bowel sounds  Extremities: No C/C/E, pulses palpable 2+  Neuro:grossly normal , follows commands  Psych:appropriate                Labs: Results:       Chemistry Recent Labs     04/04/22 0111 04/03/22 0425 04/02/22  1010   * 61* 90    140 137   K 3.7 4.5 3.2*    110 102   CO2 20* 24 25   BUN 33* 45* 52*   CREA 1.72* 1.81* 2.40*   CA 8.0* 8.6 9.6   AGAP 8 6 10   BUCR 19 25* 22*   AP  --   --  39*   TP  --   --  7.5   ALB  --   --  3.2*   GLOB  --   --  4.3*   AGRAT  --   --  0.7*      CBC w/Diff Recent Labs     04/04/22  0111 04/03/22 0425 04/02/22  1010   WBC 7.0 7.5 9.2   RBC 2.92* 3.12* 3.93*   HGB 8.1* 8.7* 10.9*   HCT 25.1* 26.7* 32.7*    165 222   GRANS  --   --  72   LYMPH  --   --  15*   EOS  --   --  0      Cardiac Enzymes No results for input(s): CPK, CKND1, KANA in the last 72 hours. No lab exists for component: CKRMB, TROIP   Coagulation No results for input(s): PTP, INR, APTT, INREXT in the last 72 hours.     Lipid Panel No results found for: CHOL, CHOLPOCT, CHOLX, CHLST, CHOLV, 479960, HDL, HDLP, LDL, LDLC, DLDLP, 399476, VLDLC, VLDL, TGLX, TRIGL, TRIGP, TGLPOCT, CHHD, CHHDX   BNP No results for input(s): BNPP in the last 72 hours.    Liver Enzymes Recent Labs     04/02/22  1010   TP 7.5   ALB 3.2*   AP 39*      Thyroid Studies Lab Results   Component Value Date/Time    TSH 1.62 04/03/2022 04:25 AM        Procedures/imaging: see electronic medical records for all procedures/Xrays and details which were not copied into this note but were reviewed prior to creation of Lamar Zepeda MD

## 2022-04-04 NOTE — PROGRESS NOTES
Pt and daughter refused PT due to:    []  Nausea/vomiting  []  Eating  []  Pain  [x]  Pt lethargic \"I've been up a few times. I'm doing fine! \"  []  Off Unit  Will f/u tomorrow, if pt is willing. Thank you.   Immanuel Rice, PTA

## 2022-04-04 NOTE — PROGRESS NOTES
Physician Progress Note      Macario Randoplh  [facility] #:                  [id number]  :                       1945  ADMIT DATE:       2022 9:42 AM  DISCH DATE:  RESPONDING  PROVIDER #:        Ananth Slater MD          QUERY TEXT:    Patient admitted with  weakness and decreased appetite . Noted documentation of Acute Kidney Injury in Hospitalist /H&P progress notes  on . In order to support the diagnosis of FARRAH, please include additional clinical indicators in your documentation. Or please document if the diagnosis of FARRAH has been ruled out after further study. The medical record reflects the following:  Risk Factors: dehydration, decreased po intake  Clinical Indicators: / Creat 2.40, 4/3/ 1.81,  1.72  Treatment: 1000 ml NS bolus    Defined by Kidney Disease Improving Global Outcomes (KDIGO) clinical practice guideline for acute kidney injury:  -Increase in SCr by greater than or equal to 0.3 mg/dl within 48 hours; or  -Increase or decrease in SCr to greater than or equal to 1.5 times baseline, which is known or presumed to have occurred within the prior 7 days; or  -Urine volume < 0.5ml/kg/h for 6 hours    Thank You  Kody Martínez RN CDI CRCR  Options provided:  -- Acute kidney injury evidenced by, Please document evidence as well as baseline creatinine, if known. -- Currently resolved acute kidney injury was evidenced by, Please document evidence as well as baseline creatinine, if known.   -- Acute kidney injury ruled out after study  -- Other - I will add my own diagnosis  -- Disagree - Not applicable / Not valid  -- Disagree - Clinically unable to determine / Unknown  -- Refer to Clinical Documentation Reviewer    PROVIDER RESPONSE TEXT:    This patient has an acute kidney injury as evidenced by decreased GFR to 24 with increase over 11 points after hydration, no CKD at baseline, so this is acute decline in renal function    Query created by: Tanya Davis on 4/4/2022 12:59 PM      Electronically signed by:  Anamaria Ram MD 4/4/2022 1:12 PM

## 2022-04-04 NOTE — PROGRESS NOTES
OCCUPATIONAL THERAPY EVALUATION    Problem: Self Care Deficits Care Plan (Adult)  Goal: *Acute Goals and Plan of Care (Insert Text)  Outcome: Progressing Towards Goal  Note:   FUNCTIONAL STATUS PRIOR TO ADMISSION: Patient was modified independent using a SB quad cane for functional mobility. HOME SUPPORT: The patient lived with mother but did not require assist.    Initial Occupational Therapy Goals (4/4/2022) Within 7 day(s):  1. Patient will perform perform grooming standing sink level for 5 minutes for increased independence in ADLs. 2. Patient will perform UB dressing with I seated EOB for increased independence with ADLs. 3. Patient will perform LB dressing with mod I & A/E PRN for increased independence with ADLs. 4. Patient will perform all aspects of toileting with mod I for increased independence with ADLs. 5. Patient will perform LE ADLs utilizing body mechanics & adaptive strategies with 1 verbal cue for increased safety in ADLs. 6. Patient will independently apply energy conservation techniques with no verbal cue(s) for increased independence with ADLs. Patient: Vaughn Stewart (40 y.o. female)  Date: 4/4/2022  Primary Diagnosis: Renal failure [N19]  Dehydration [E86.0]        Precautions:   Fall  PLOF: Pt reports living at home with mother, daughter will be living with her for short time. Pt was using cane for ambulation, shower chair for bathing. Grossly mod I    ASSESSMENT :  Based on the objective data described below, the patient presents with decreased safety awareness and decreased stability during ADL tasks following above diagnosis. Pt presents supine in bed, daughter present as well. Pt able to perform bed mobility with supervision, sit to stand with supervision. Pt balance tested, weak L UE but able to maintain balance through multiple resistive planes. Pt able to perform LB dressing with supervision. Able to perform ambulation to bathroom with CGA, perform toilet transfer with Cga. Pt has difficulty with standing from low toilet position. Obtained a BSC to place over to give a boost and educated pt on family on how to obtain one through [employer] if this is effective. Pt returns to supine position with CGA. Pt will be followed 1-2x prior to d/c to work on safety awareness in bathroom and shower. Education: Pt educated on role of OT in acute setting. Patient will benefit from skilled intervention to address the above impairments. Patient's rehabilitation potential is considered to be Excellent  Factors which may influence rehabilitation potential include:   []             None noted  []             Mental ability/status  [x]             Medical condition  []             Home/family situation and support systems  [x]             Safety awareness  []             Pain tolerance/management  []             Other:      PLAN :  Recommendations and Planned Interventions:   [x]               Self Care Training                  [x]      Therapeutic Activities  [x]               Functional Mobility Training   []      Cognitive Retraining  [x]               Therapeutic Exercises           [x]      Endurance Activities  [x]               Balance Training                    []      Neuromuscular Re-Education  []               Visual/Perceptual Training     [x]      Home Safety Training  [x]               Patient Education                   [x]      Family Training/Education  []               Other (comment):    Frequency/Duration: Patient will be followed by occupational therapy 2 times a week to address goals. Discharge Recommendations: Home Health  Further Equipment Recommendations for Discharge: Raised toilet seat     SUBJECTIVE:   Patient stated oh I gotta do all that? Bernadette Imus    OBJECTIVE DATA SUMMARY:     Past Medical History:   Diagnosis Date    Hyperlipidemia     Hypertension    History reviewed. No pertinent surgical history.   Barriers to Learning/Limitations: None  Compensate with: visual, verbal, tactile, kinesthetic cues/model    Home Situation:   Home Situation  Home Environment: Private residence  # Steps to Enter: 2  One/Two Story Residence: One story  # of Interior Steps: 12 (8+4 with landing between)  Living Alone: No  Support Systems: Child(na),Parent(s)  Patient Expects to be Discharged to[de-identified] Home  Current DME Used/Available at Home: [address], [place], straight,Grab bars,Shower chair  Tub or Shower Type: Shower  [x]  Right hand dominant   []  Left hand dominant    Cognitive/Behavioral Status:  Neurologic State: Alert; Appropriate for age  Orientation Level: Oriented X4  Cognition: Appropriate decision making  Safety/Judgement: Fall prevention    Skin: intact  Edema: none noted    Vision/Perceptual:    Tracking: Able to track stimulus in all quadrants w/o difficulty             Coordination: BUE  Coordination: Within functional limits  Fine Motor Skills-Upper: Left Intact; Right Intact    Gross Motor Skills-Upper: Left Intact; Right Intact    Balance:  Sitting: Intact  Standing: Intact; With support  Standing - Static: Fair  Standing - Dynamic : Fair    Strength: BUE  Strength: Generally decreased, functional         Tone & Sensation: BUE  Tone: Normal  Sensation: Intact      Range of Motion: BUE  AROM: Within functional limits       Functional Mobility and Transfers for ADLs:  Bed Mobility:    Transfers:  Sit to Stand: Supervision          Toilet Transfer : Contact guard assistance         Bathroom Mobility: Contact guard assistance    ADL Assessment:   Feeding: Setup    Oral Facial Hygiene/Grooming: Contact guard assistance    Bathing: Contact guard assistance    Upper Body Dressing: Supervision    Lower Body Dressing: Contact guard assistance    Toileting: Contact guard assistance         ADL Intervention:       Lower Body Dressing Assistance  Dressing Assistance: Contact guard assistance  Socks: Contact guard assistance  Leg Crossed Method Used: Yes  Position Performed: Seated edge of bed;Long sitting on bed         Cognitive Retraining  Executive Functions: Managing time;Regulating behavior  Safety/Judgement: Fall prevention      Pain:  Pain level pre-treatment: 0/10   Pain level post-treatment: 0/10   Pain Intervention(s): Medication provided by Nursing (see MAR); Rest, Ice, Repositioning   Response to intervention: Nurse notified, See doc flow sheet    Activity Tolerance:   Good, no SOB or LOB noted during ADL tasks    Please refer to the flowsheet for vital signs taken during this treatment. After treatment:   [] Patient left in no apparent distress sitting up in chair  [] Patient left in no apparent distress in bed  [x] Call bell left within reach  [x] Nursing notified  [] Caregiver present  [] Bed alarm activated    COMMUNICATION/EDUCATION:   [x] Role of Occupational Therapy in the acute care setting  [x] Home safety education was provided and the patient/caregiver indicated understanding. [x] Patient/family have participated as able in goal setting and plan of care. [x] Patient/family agree to work toward stated goals and plan of care. [] Patient understands intent and goals of therapy, but is neutral about his/her participation. [] Patient is unable to participate in goal setting and plan of care. Thank you for this referral.  Kajal Hinojosa, OTR/L, CEAS, CSRS, ECDCS, CFPS, CGCP  Time Calculation: 17 mins    Eval Complexity: History: MEDIUM Complexity : Expanded review of history including physical, cognitive and psychosocial  history ; Examination: MEDIUM Complexity : 3-5 performance deficits relating to physical, cognitive , or psychosocial skils that result in activity limitations and / or participation restrictions; Decision Making:MEDIUM Complexity : Patient may present with comorbidities that affect occupational performnce.  Miniml to moderate modification of tasks or assistance (eg, physical or verbal ) with assesment(s) is necessary to enable patient to complete evaluation

## 2022-04-05 VITALS
TEMPERATURE: 97.2 F | SYSTOLIC BLOOD PRESSURE: 96 MMHG | WEIGHT: 200 LBS | RESPIRATION RATE: 16 BRPM | HEART RATE: 81 BPM | OXYGEN SATURATION: 100 % | HEIGHT: 65 IN | BODY MASS INDEX: 33.32 KG/M2 | DIASTOLIC BLOOD PRESSURE: 58 MMHG

## 2022-04-05 PROBLEM — N17.9 AKI (ACUTE KIDNEY INJURY) (HCC): Status: ACTIVE | Noted: 2022-04-05

## 2022-04-05 LAB
ANION GAP SERPL CALC-SCNC: 8 MMOL/L (ref 3–18)
BUN SERPL-MCNC: 24 MG/DL (ref 7–18)
BUN/CREAT SERPL: 18 (ref 12–20)
CALCIUM SERPL-MCNC: 7.9 MG/DL (ref 8.5–10.1)
CHLORIDE SERPL-SCNC: 111 MMOL/L (ref 100–111)
CO2 SERPL-SCNC: 20 MMOL/L (ref 21–32)
CREAT SERPL-MCNC: 1.35 MG/DL (ref 0.6–1.3)
ERYTHROCYTE [DISTWIDTH] IN BLOOD BY AUTOMATED COUNT: 13.3 % (ref 11.6–14.5)
GLUCOSE SERPL-MCNC: 73 MG/DL (ref 74–99)
HCT VFR BLD AUTO: 24.9 % (ref 35–45)
HGB BLD-MCNC: 8.1 G/DL (ref 12–16)
MAGNESIUM SERPL-MCNC: 1.7 MG/DL (ref 1.6–2.6)
MCH RBC QN AUTO: 27.7 PG (ref 24–34)
MCHC RBC AUTO-ENTMCNC: 32.5 G/DL (ref 31–37)
MCV RBC AUTO: 85.3 FL (ref 78–100)
NRBC # BLD: 0 K/UL (ref 0–0.01)
NRBC BLD-RTO: 0 PER 100 WBC
PLATELET # BLD AUTO: 165 K/UL (ref 135–420)
PMV BLD AUTO: 12.1 FL (ref 9.2–11.8)
POTASSIUM SERPL-SCNC: 3.3 MMOL/L (ref 3.5–5.5)
RBC # BLD AUTO: 2.92 M/UL (ref 4.2–5.3)
SODIUM SERPL-SCNC: 139 MMOL/L (ref 136–145)
WBC # BLD AUTO: 7.2 K/UL (ref 4.6–13.2)

## 2022-04-05 PROCEDURE — 74011250637 HC RX REV CODE- 250/637: Performed by: HOSPITALIST

## 2022-04-05 PROCEDURE — 74011000250 HC RX REV CODE- 250: Performed by: HOSPITALIST

## 2022-04-05 PROCEDURE — 97116 GAIT TRAINING THERAPY: CPT

## 2022-04-05 PROCEDURE — 85027 COMPLETE CBC AUTOMATED: CPT

## 2022-04-05 PROCEDURE — 74011250636 HC RX REV CODE- 250/636: Performed by: HOSPITALIST

## 2022-04-05 PROCEDURE — 83735 ASSAY OF MAGNESIUM: CPT

## 2022-04-05 PROCEDURE — 36415 COLL VENOUS BLD VENIPUNCTURE: CPT

## 2022-04-05 PROCEDURE — 80048 BASIC METABOLIC PNL TOTAL CA: CPT

## 2022-04-05 RX ORDER — MIRTAZAPINE 15 MG/1
15 TABLET, FILM COATED ORAL
Qty: 30 TABLET | Refills: 0 | Status: SHIPPED | OUTPATIENT
Start: 2022-04-05 | End: 2022-04-05 | Stop reason: SDUPTHER

## 2022-04-05 RX ORDER — MIRTAZAPINE 15 MG/1
15 TABLET, FILM COATED ORAL
Qty: 30 TABLET | Refills: 0 | Status: SHIPPED | OUTPATIENT
Start: 2022-04-05

## 2022-04-05 RX ORDER — MAGNESIUM SULFATE HEPTAHYDRATE 40 MG/ML
2 INJECTION, SOLUTION INTRAVENOUS ONCE
Status: COMPLETED | OUTPATIENT
Start: 2022-04-05 | End: 2022-04-05

## 2022-04-05 RX ORDER — POTASSIUM CHLORIDE 20 MEQ/1
40 TABLET, EXTENDED RELEASE ORAL
Status: COMPLETED | OUTPATIENT
Start: 2022-04-05 | End: 2022-04-05

## 2022-04-05 RX ADMIN — POTASSIUM CHLORIDE 40 MEQ: 20 TABLET, EXTENDED RELEASE ORAL at 08:47

## 2022-04-05 RX ADMIN — FENOFIBRATE 145 MG: 145 TABLET ORAL at 08:48

## 2022-04-05 RX ADMIN — Medication 250 MG: at 08:47

## 2022-04-05 RX ADMIN — FAMOTIDINE 20 MG: 10 INJECTION, SOLUTION INTRAVENOUS at 08:49

## 2022-04-05 RX ADMIN — MAGNESIUM SULFATE HEPTAHYDRATE 2 G: 40 INJECTION, SOLUTION INTRAVENOUS at 10:37

## 2022-04-05 RX ADMIN — HEPARIN SODIUM 5000 UNITS: 5000 INJECTION INTRAVENOUS; SUBCUTANEOUS at 00:09

## 2022-04-05 RX ADMIN — HEPARIN SODIUM 5000 UNITS: 5000 INJECTION INTRAVENOUS; SUBCUTANEOUS at 08:48

## 2022-04-05 RX ADMIN — Medication 100 MG: at 09:00

## 2022-04-05 RX ADMIN — MULTIPLE VITAMINS W/ MINERALS TAB 1 TABLET: TAB at 08:47

## 2022-04-05 NOTE — PROGRESS NOTES
Dual AVS reviewed with АЛЕКСАНДР Person. All medications reviewed individually with patient. Opportunities for questions and concerns provided. Patient discharged via [facility] to main entrance  Patient's arm band appropriately discarded. Amol Lazar

## 2022-04-05 NOTE — DISCHARGE SUMMARY
Discharge Summary    Patient: Deja Lee MRN: [id number]  CSN: [id number]    YOB: 1945  Age: 77 y.o. Sex: female    DOA: 4/2/2022 LOS:  LOS: 3 days   Discharge Date:      Primary Care Provider:  Umesh Francisco MD    Admission Diagnoses: Renal failure [N19]  Dehydration [E86.0]  FARRAH (acute kidney injury) [facility]) [N17.9]    Discharge Diagnoses:    Hospital Problems  Never Reviewed          Codes Class Noted POA    FARRAH (acute kidney injury) ([facility] [address] ICD-10-CM: N17.9  ICD-9-CM: 584.9  4/5/2022 Unknown        Dehydration ICD-10-CM: E86.0  ICD-9-CM: 276.51  4/2/2022 Unknown        * (Principal) Renal failure ICD-10-CM: N19  ICD-9-CM: 488  4/2/2022 Unknown        HTN (hypertension) ICD-10-CM: I10  ICD-9-CM: 401.9  4/2/2022 Unknown        Weight loss ICD-10-CM: R63.4  ICD-9-CM: 783.21  4/2/2022 Unknown        Alcohol use ICD-10-CM: Z72.89  ICD-9-CM: V49.89  4/2/2022 Unknown        Tobacco use ICD-10-CM: Z72.0  ICD-9-CM: 305.1  4/2/2022 Unknown              Discharge Condition: stable     Discharge Medications:     Current Discharge Medication List      START taking these medications    Details   mirtazapine (REMERON) 15 mg tablet Take 1 Tablet by mouth nightly. Qty: 30 Tablet, Refills: 0  Start date: 4/5/2022      multivitamin, tx-iron-ca-min (THERA-M w/ IRON) 9 mg iron-400 mcg tab tablet Take 1 Tablet by mouth daily. Qty: 30 Tablet, Refills: 0  Start date: 4/6/2022         CONTINUE these medications which have NOT CHANGED    Details   Fenofibrate 50 mg cap Take 200 mg by mouth.  Indications: excessive fat in the blood         STOP taking these medications       amLODIPine-benazepril (LOTREL) 5-20 mg per capsule Comments:   Reason for Stopping:         atenoloL-chlorthalidone (TENORETIC) 100-25 mg per tablet Comments:   Reason for Stopping:               Procedures : none     Consults: None      PHYSICAL EXAM   Visit Vitals  BP (!) 101/55 (BP Patient Position: At rest)   Pulse 72   Temp 98.2 °F (36.8 °C)   Resp 19   Ht 5' 5\" (1.651 m)   Wt 90.7 kg (200 lb)   SpO2 100%   BMI 33.28 kg/m²     General: Awake, cooperative, no acute distress    HEENT: NC, Atraumatic. PERRLA, EOMI. Anicteric sclerae. Lungs:  CTA Bilaterally. No Wheezing/Rhonchi/Rales. Heart:  Regular  rhythm,  No murmur, No Rubs, No Gallops  Abdomen: Soft, Non distended, Non tender. +Bowel sounds,   Extremities: No c/c/e  Psych:   Not anxious or agitated. Neurologic:  No acute neurological deficits. Admission HPI :   Flori Moreland is a 77 y.o. female with HTN, hyperlipidemia, is brought to ER by her daughter with concerns of decreased PO intake and weight loss. Patient reports that she had her HTN medication changed in [place] last year. Since then she has not been feeling well. Her symptoms which include weakness and decreased appetite got worse after she took booster dose of COVID 19 vaccine. Daughter reports that for the past one month she had progressive decline in her condition and appetite. She has not been eating or drinking. At times she has dry heaves. Daughter reports that patient lost about 50lbs in the past 5 months. Patient drinks beer daily. Is a smoker and has been smoking for years. In ER work up showed elevated BUN/Cr at 52/2. 4. her CT abdomen/pelvis showed questionable pancreatitis, however lipase in normal range. Hospital Course : This 76 yo female has progressively weakened , lost weight, and had diminished appetite X 3 months. She was admitted to due to acute renal failure. Since she was admitted to the hospital IV hydration was started. Nutritionist has been consulted for her malnutrition. ciwa protocol was performed for alcohol drinking. Her renal function has been follow-up closely. On discharge, her creatinine was back to 1.3. She also received  potassium and magnesium replacement. Education was given  for quitting alcohol.   Patient also received PT OT and recommended home health. Her blood pressure medication has been holding during admission, bp  has been low normal range and  recommend to restart her medication per SBP tolerated. No source of infection noted. Pelvic ultrasound has been performed due to abnormal report from CT:Benign-appearing peripherally calcified cystic structure in the right  adnexacorresponding to the abnormality on recent CT. No solid component  identified. Discharge planning discussed with patient, pt agrees  with the plan and no questions and concerns at this point. Activity: Activity as tolerated    Diet: Regular Diet    Follow-up: PCP    Disposition: home with home health     Minutes spent on discharge: 45 min       Labs: Results:       Chemistry Recent Labs     04/05/22 0406 04/04/22 0111 04/03/22  0425   GLU 73* 101* 61*    139 140   K 3.3* 3.7 4.5    111 110   CO2 20* 20* 24   BUN 24* 33* 45*   CREA 1.35* 1.72* 1.81*   CA 7.9* 8.0* 8.6   AGAP 8 8 6   BUCR 18 19 25*      CBC w/Diff Recent Labs     04/05/22 0406 04/04/22 0111 04/03/22  0425   WBC 7.2 7.0 7.5   RBC 2.92* 2.92* 3.12*   HGB 8.1* 8.1* 8.7*   HCT 24.9* 25.1* 26.7*    161 165      Cardiac Enzymes No results for input(s): CPK, CKND1, KANA in the last 72 hours. No lab exists for component: CKRMB, TROIP   Coagulation No results for input(s): PTP, INR, APTT, INREXT in the last 72 hours. Lipid Panel No results found for: CHOL, CHOLPOCT, CHOLX, CHLST, CHOLV, 097781, HDL, HDLP, LDL, LDLC, DLDLP, 248636, VLDLC, VLDL, TGLX, TRIGL, TRIGP, TGLPOCT, CHHD, CHHDX   BNP No results for input(s): BNPP in the last 72 hours. Liver Enzymes No results for input(s): TP, ALB, TBIL, AP in the last 72 hours.     No lab exists for component: SGOT, GPT, DBIL   Thyroid Studies Lab Results   Component Value Date/Time    TSH 1.62 04/03/2022 04:25 AM            Significant Diagnostic Studies: XR CHEST PA LAT    Result Date: 4/3/2022  EXAM: XR CHEST PA LAT CLINICAL INDICATION/HISTORY: smoker, weight loss   > Additional: None. COMPARISON: None. TECHNIQUE:  Frontal and lateral views _______________ FINDINGS: SUPPORT DEVICES: None. HEART AND MEDIASTINUM: Normal size and contour. Normal pulmonary vasculature. LUNGS AND PLEURAL SPACES: The lungs are well expanded and clear. No focal consolidation, effusion, or pneumothorax. BONY THORAX AND SOFT TISSUES: No acute osseous abnormality. Bilateral breast implants and surgical clips noted. _______________     No active cardiopulmonary disease. CT ABD PELV WO CONT    Result Date: 4/2/2022  EXAM: CT of the abdomen and pelvis CLINICAL INDICATION/HISTORY: anorexia   > Additional: None. COMPARISON: None. > Reference Exam: None. TECHNIQUE: Axial CT imaging of the abdomen and pelvis was performed without intravenous contrast. Multiplanar reformats were generated. One or more dose reduction techniques were used on this CT: automated exposure control, adjustment of the mAs and/or kVp according to patient size, and iterative reconstruction techniques. The specific techniques used on this CT exam have been documented in the patient's electronic medical record. Digital Imaging and Communications in Medicine (DICOM) format image data are available to nonaffiliated external healthcare facilities or entities on a secure, media free, reciprocally searchable basis with patient authorization for at least a 12-month period after this study. _______________ FINDINGS: LOWER CHEST: Bilateral breast implants noted. There is mild atelectasis at the right lung base. LIVER, BILIARY: Liver is normal. No biliary dilation. Gallbladder is unremarkable. PANCREAS: Questionable very subtle peripancreatic stranding around the head of the pancreas on axial images 39 and 41. Otherwise unremarkable. SPLEEN: Normal. ADRENALS: Normal. KIDNEYS/URETERS/BLADDER: Small cyst in the left kidney. LYMPH NODES: No enlarged lymph nodes.  GASTROINTESTINAL TRACT: No bowel dilation or wall thickening. PELVIC ORGANS: Prior hysterectomy. Dystrophic partially calcified mass in the posterior right adnexa, benign-appearing. A measures 4.1 x 2.8 cm on image 87. VASCULATURE: Dense scattered atherosclerosis. BONES: No acute or aggressive osseous abnormalities identified. Chronic degenerative changes are noted throughout the spine including mild to moderate anterior height loss at L1, technically age indeterminate but chronic in appearance. OTHER: No ascites. _______________     1. Questionable very subtle peripancreatic stranding near the head of the pancreas may reflect early changes of acute pancreatitis. Recommend correlation with exam and laboratory findings. 2. Otherwise, no acute inflammatory process in the abdomen or pelvis. 3. Indeterminate partially calcified mass with dystrophic calcifications of the posterior right adnexa, benign-appearing but incompletely assessed. Correlation with prior imaging would be helpful. Consider follow-up nonemergent pelvic ultrasound. 4. Additional chronic changes as above. US PELV NON OB W TV    Result Date: 4/3/2022  EXAM: PELVIC ULTRASOUND CLINICAL INDICATION/HISTORY: follow up on CT abdomen -Additional:  None COMPARISON: CT dated April 2, 2022 TECHNIQUE: Real-time transabdominal and transvaginal sonography in multiple planes of the pelvis was performed with image documentation. Grayscale, color flow Doppler imaging, and velocity spectral waveform analysis of the ovaries was performed (duplex imaging). _______________ FINDINGS: The transvaginal portion of the examination was extremely limited, obscured by bowel gas. Study is also somewhat limited secondary to patient immobility. UTERUS: Status post hysterectomy. RIGHT ADNEXA: Right ovary is not clearly visualized. A cystic mass with a peripherally calcified rim measures 2.3 x 2.2 x 3.3 cm and corresponds to the area described on the recent CT. No internal vascularity is appreciated.  This is only visualized transabdominally. LEFT ADNEXA: The left ovary is not visualized. The adnexa is completely obscured by overlying bowel gas. OTHER: None. _______________     Limited study secondary to technical factors above. Benign-appearing peripherally calcified cystic structure in the right adnexacorresponding to the abnormality on recent CT. No solid component identified.               [facility]     CC: Marybeth Goltz, MD

## 2022-04-05 NOTE — PROGRESS NOTES
Problem: Falls - Risk of  Goal: *Absence of Falls  Description: Document Veronica Sidhu Fall Risk and appropriate interventions in the flowsheet.   Outcome: Progressing Towards Goal  Note: Fall Risk Interventions:  Mobility Interventions: Assess mobility with egress test         Medication Interventions: Patient to call before getting OOB    Elimination Interventions: Call light in reach

## 2022-04-05 NOTE — PROGRESS NOTES
Problem: Mobility Impaired (Adult and Pediatric)  Goal: *Acute Goals and Plan of Care (Insert Text)  Description: Physical Therapy Goals   Initiated 4/3/2022 and to be accomplished within 5-7 day(s)  1. Patient will move from supine <> sit with mod I in prep for out of bed activity and change of position. 2.  Patient will perform sit<> stand with mod I/LRAD in prep for transfers/ambulation. 3.  Patient will transfer from bed <> chair with mod I/LRAD for time up in chair for completion of ADL activity as appropriate. 4.  Patient will ambulate 200 feet with S/mod I/LRAD for improved functional mobility at discharge. 5.  Patient will ascend/descend flight of stairs with handrail(s) with S for home navigation as needed. Outcome: Progressing Towards Goal   PHYSICAL THERAPY TREATMENT    Patient: Ra Hazel (89 y.o. female)  Date: 4/5/2022  Diagnosis: Renal failure [N19]  Dehydration [E86.0] Renal failure    Precautions: Fall  PLOF: independent, ambulatory without AD    ASSESSMENT:  Pt sitting up EOB. Both pt and daughter irritated by PT presence. No difficulty performing sit to stand. Ambulated without AD, mild unsteadiness but no LOB. Negotiated 4 steps holding (B) hand rails without difficulty. Returned to room and left sitting EOB. Progression toward goals:   []      Improving appropriately and progressing toward goals  [x]      Improving slowly and progressing toward goals  []      Not making progress toward goals and plan of care will be adjusted     PLAN:  Patient continues to benefit from skilled intervention to address the above impairments. Continue treatment per established plan of care.   Discharge Recommendations:  None  Further Equipment Recommendations for Discharge:  N/A     SUBJECTIVE:   Patient stated You all just come right in.    OBJECTIVE DATA SUMMARY:   Critical Behavior:  Neurologic State: Alert  Orientation Level: Oriented X4  Cognition: Appropriate for age attention/concentration,Appropriate decision making  Safety/Judgement: Fall prevention  Functional Mobility Training:  Transfers:  Sit to Stand: Independent  Stand to Sit: Independent  Balance:  Sitting: Intact  Standing: With support  Standing - Static: Fair  Standing - Dynamic : Fair   Ambulation/Gait Training:  Distance (ft): 140 Feet (ft)  Assistive Device: Gait belt  Ambulation - Level of Assistance: Contact guard assistance;Stand-by assistance  Stairs:  Number of Stairs Trained: 4  Stairs - Level of Assistance: Stand-by assistance  Rail Use: Both        Pain:  Pain level pre-treatment: 0/10  Pain level post-treatment: 0/10   Pain Intervention(s): Medication (see MAR); Rest, Ice, Repositioning   Response to intervention: Nurse notified, See doc flow    Activity Tolerance:   fair  Please refer to the flowsheet for vital signs taken during this treatment. After treatment:   [] Patient left in no apparent distress sitting up in chair  [x] Patient left in no apparent distress in bed  [x] Call bell left within reach  [] Nursing notified  [x] Caregiver present  [] Bed alarm activated  [] SCDs applied      COMMUNICATION/EDUCATION:   [x]         Role of Physical Therapy in the acute care setting. []         Fall prevention education was provided and the patient/caregiver indicated understanding. []         Patient/family have participated as able in working toward goals and plan of care. []         Patient/family agree to work toward stated goals and plan of care. [x]         Patient understands intent and goals of therapy, but is neutral about his/her participation.   []         Patient is unable to participate in stated goals/plan of care: ongoing with therapy staff.  []         Other:        Joanne Salazar PTA   Time Calculation: 12 mins

## 2022-04-15 LAB — VIT B1 BLD-SCNC: 102.1 NMOL/L (ref 66.5–200)
